# Patient Record
Sex: FEMALE | URBAN - METROPOLITAN AREA
[De-identification: names, ages, dates, MRNs, and addresses within clinical notes are randomized per-mention and may not be internally consistent; named-entity substitution may affect disease eponyms.]

---

## 2019-09-04 ENCOUNTER — APPOINTMENT (RX ONLY)
Dept: URBAN - METROPOLITAN AREA CLINIC 88 | Facility: CLINIC | Age: 34
Setting detail: DERMATOLOGY
End: 2019-09-04

## 2019-09-04 DIAGNOSIS — L73.2 HIDRADENITIS SUPPURATIVA: ICD-10-CM

## 2019-09-04 PROCEDURE — ? COUNSELING

## 2019-09-04 PROCEDURE — ? ADDITIONAL NOTES

## 2019-09-04 PROCEDURE — 99202 OFFICE O/P NEW SF 15 MIN: CPT

## 2019-09-04 ASSESSMENT — LOCATION ZONE DERM
LOCATION ZONE: VULVA
LOCATION ZONE: LEG

## 2019-09-04 ASSESSMENT — LOCATION DETAILED DESCRIPTION DERM
LOCATION DETAILED: RIGHT ANTERIOR PROXIMAL THIGH
LOCATION DETAILED: MONS PUBIS
LOCATION DETAILED: LEFT ANTERIOR PROXIMAL THIGH

## 2019-09-04 ASSESSMENT — LOCATION SIMPLE DESCRIPTION DERM
LOCATION SIMPLE: RIGHT THIGH
LOCATION SIMPLE: GROIN
LOCATION SIMPLE: LEFT THIGH

## 2019-09-04 NOTE — PROCEDURE: ADDITIONAL NOTES
Additional Notes: pt has been dealing with HS for several years and has tried oral abx, topical abx, and injections of IlK as well as surgery in the past without Improvement.  She is currently at hurler stage three of HS with lots of sinus tract formations.  I explained that oral abx will not resolve her condition, but a short course may give her time to consider plastic surgery options.  Patient is to go to a plastic surgeon for a consultation visit due to the severity of the condition.  We also consulted the patient on using humira. Patient was given information on the medication and wanted to be given time to decide, she is concerned with the side effects of humira.  Pt to call me back in case she wants to start oral abx or if she wants to begin humira.  Name of local plastic surgeon provided to pt today.
Detail Level: Zone

## 2022-06-02 ENCOUNTER — APPOINTMENT (RX ONLY)
Dept: URBAN - METROPOLITAN AREA CLINIC 114 | Facility: CLINIC | Age: 37
Setting detail: DERMATOLOGY
End: 2022-06-02

## 2022-06-02 DIAGNOSIS — L73.2 HIDRADENITIS SUPPURATIVA: ICD-10-CM | Status: WORSENING

## 2022-06-02 PROCEDURE — ? ORDER TESTS

## 2022-06-02 PROCEDURE — 99204 OFFICE O/P NEW MOD 45 MIN: CPT

## 2022-06-02 PROCEDURE — ? HUMIRA INITIATION

## 2022-06-02 PROCEDURE — ? COUNSELING

## 2022-06-02 PROCEDURE — ? ADDITIONAL NOTES

## 2022-06-02 ASSESSMENT — LOCATION ZONE DERM: LOCATION ZONE: VULVA

## 2022-06-02 ASSESSMENT — LOCATION SIMPLE DESCRIPTION DERM: LOCATION SIMPLE: GROIN

## 2022-06-02 ASSESSMENT — HURLEY STAGE
IN YOUR EXPERIENCE, AMONG ALL PATIENTS YOU HAVE SEEN WITH THIS CONDITION, HOW SEVERE IS THIS PATIENT'S CONDITION?: HURLEY STAGE III: MULTIPLE INTERCONNECTED SINUS TRACTS AND ABSCESSES THROUGHOUT AN ENTIRE AREA

## 2022-06-02 ASSESSMENT — LOCATION DETAILED DESCRIPTION DERM: LOCATION DETAILED: MONS PUBIS

## 2022-06-02 NOTE — PROCEDURE: HUMIRA INITIATION
Detail Level: Zone
Is Phototherapy Contraindicated?: No
Pregnancy And Lactation Warning Text: This medication is Pregnancy Category B and is considered safe during pregnancy. It is unknown if this medication is excreted in breast milk.
Humira Dosing: 80 mg SC day 0, 40 mg SC day 7, then 40 mg SC every other week
Diagnosis (Required): Psoriasis
Humira Monitoring Guidelines: A yearly test for tuberculosis is required while taking Humira.

## 2022-06-02 NOTE — HPI: RASH
What Type Of Note Output Would You Prefer (Optional)?: Bullet Format
How Severe Is Your Rash?: severe
Is This A New Presentation, Or A Follow-Up?: Rash
Additional History: \\nPt is here rash in groin area was Sx years ago with HS and has multiple treatment including surgery after surgery five years ago resurfaced no tx was done.

## 2022-06-02 NOTE — PROCEDURE: ORDER TESTS
Bill For Surgical Tray: no
Performing Laboratory: [id number]
Lab Facility: [id number]
Billing Type: Third-Party Bill
Expected Date Of Service: 06/02/2022

## 2022-06-02 NOTE — PROCEDURE: ADDITIONAL NOTES
Render Risk Assessment In Note?: no
Additional Notes: Discussed in length with pt Humira and its side effects and what it can be used for such Psoriatic arthritis patients. Discussed with pt that  there will other medication that will come to help with HS. Discuss that Humira will help with her severe HS as she has failed oral antibiotics, ILK, and topical medications
Detail Level: Simple
not examined

## 2022-08-10 ENCOUNTER — RX ONLY (OUTPATIENT)
Age: 37
Setting detail: RX ONLY
End: 2022-08-10

## 2022-08-10 RX ORDER — ADALIMUMAB 80MG/0.8ML
KIT SUBCUTANEOUS
Qty: 1 | Refills: 0 | Status: ERX | COMMUNITY
Start: 2022-08-10

## 2022-08-16 ENCOUNTER — APPOINTMENT (RX ONLY)
Dept: URBAN - METROPOLITAN AREA CLINIC 114 | Facility: CLINIC | Age: 37
Setting detail: DERMATOLOGY
End: 2022-08-16

## 2022-08-16 DIAGNOSIS — Z79.899 OTHER LONG TERM (CURRENT) DRUG THERAPY: ICD-10-CM

## 2022-08-16 DIAGNOSIS — L73.2 HIDRADENITIS SUPPURATIVA: ICD-10-CM | Status: WORSENING

## 2022-08-16 PROCEDURE — ? HUMIRA INJECTION

## 2022-08-16 PROCEDURE — ? SEPARATE AND IDENTIFIABLE DOCUMENTATION

## 2022-08-16 PROCEDURE — ? COUNSELING

## 2022-08-16 PROCEDURE — ? LAB REPORTS REVIEWED

## 2022-08-16 PROCEDURE — ? HIGH RISK MEDICATION MONITORING

## 2022-08-16 PROCEDURE — 99214 OFFICE O/P EST MOD 30 MIN: CPT | Mod: 25

## 2022-08-16 PROCEDURE — 96372 THER/PROPH/DIAG INJ SC/IM: CPT

## 2022-08-16 PROCEDURE — ? ADDITIONAL NOTES

## 2022-08-16 ASSESSMENT — LOCATION SIMPLE DESCRIPTION DERM
LOCATION SIMPLE: LEFT THIGH
LOCATION SIMPLE: RIGHT THIGH
LOCATION SIMPLE: GROIN

## 2022-08-16 ASSESSMENT — LOCATION DETAILED DESCRIPTION DERM
LOCATION DETAILED: LEFT ANTERIOR PROXIMAL THIGH
LOCATION DETAILED: MONS PUBIS
LOCATION DETAILED: RIGHT ANTERIOR PROXIMAL THIGH

## 2022-08-16 ASSESSMENT — LOCATION ZONE DERM
LOCATION ZONE: VULVA
LOCATION ZONE: LEG

## 2022-08-16 NOTE — PROCEDURE: HIGH RISK MEDICATION MONITORING
Cyclophosphamide Counseling:  I discussed with the patient the risks of cyclophosphamide including but not limited to hair loss, hormonal abnormalities, decreased fertility, abdominal pain, diarrhea, nausea and vomiting, bone marrow suppression and infection. The patient understands that monitoring is required while taking this medication.
Ilumya Pregnancy And Lactation Text: The risk during pregnancy and breastfeeding is uncertain with this medication.
Drysol Pregnancy And Lactation Text: This medication is considered safe during pregnancy and breast feeding.
Minocycline Counseling: Patient advised regarding possible photosensitivity and discoloration of the teeth, skin, lips, tongue and gums.  Patient instructed to avoid sunlight, if possible.  When exposed to sunlight, patients should wear protective clothing, sunglasses, and sunscreen.  The patient was instructed to call the office immediately if the following severe adverse effects occur:  hearing changes, easy bruising/bleeding, severe headache, or vision changes.  The patient verbalized understanding of the proper use and possible adverse effects of minocycline.  All of the patient's questions and concerns were addressed.
Dutasteride Pregnancy And Lactation Text: This medication is absolutely contraindicated in women, especially during pregnancy and breast feeding. Feminization of male fetuses is possible if taking while pregnant.
Birth Control Pills Pregnancy And Lactation Text: This medication should be avoided if pregnant and for the first 30 days post-partum.
Detail Level: Detailed
Terbinafine Counseling: Patient counseling regarding adverse effects of terbinafine including but not limited to headache, diarrhea, rash, upset stomach, liver function test abnormalities, itching, taste/smell disturbance, nausea, abdominal pain, and flatulence.  There is a rare possibility of liver failure that can occur when taking terbinafine.  The patient understands that a baseline LFT and kidney function test may be required. The patient verbalized understanding of the proper use and possible adverse effects of terbinafine.  All of the patient's questions and concerns were addressed.
Hydroxychloroquine Counseling:  I discussed with the patient that a baseline ophthalmologic exam is needed at the start of therapy and every year thereafter while on therapy. A CBC may also be warranted for monitoring.  The side effects of this medication were discussed with the patient, including but not limited to agranulocytosis, aplastic anemia, seizures, rashes, retinopathy, and liver toxicity. Patient instructed to call the office should any adverse effect occur.  The patient verbalized understanding of the proper use and possible adverse effects of Plaquenil.  All the patient's questions and concerns were addressed.
Topical Sulfur Applications Pregnancy And Lactation Text: This medication is considered safe during pregnancy and breast feeding secondary to limited systemic absorption.
Skyrizi Counseling: I discussed with the patient the risks of risankizumab-rzaa including but not limited to immunosuppression, and serious infections.  The patient understands that monitoring is required including a PPD at baseline and must alert us or the primary physician if symptoms of infection or other concerning signs are noted.
Minoxidil Counseling: Minoxidil is a topical medication which can increase blood flow where it is applied. It is uncertain how this medication increases hair growth. Side effects are uncommon and include stinging and allergic reactions.
Bactrim Counseling:  I discussed with the patient the risks of sulfa antibiotics including but not limited to GI upset, allergic reaction, drug rash, diarrhea, dizziness, photosensitivity, and yeast infections.  Rarely, more serious reactions can occur including but not limited to aplastic anemia, agranulocytosis, methemoglobinemia, blood dyscrasias, liver or kidney failure, lung infiltrates or desquamative/blistering drug rashes.
Minoxidil Pregnancy And Lactation Text: This medication has not been assigned a Pregnancy Risk Category but animal studies failed to show danger with the topical medication. It is unknown if the medication is excreted in breast milk.
Bactrim Pregnancy And Lactation Text: This medication is Pregnancy Category D and is known to cause fetal risk.  It is also excreted in breast milk.
Benzoyl Peroxide Counseling: Patient counseled that medicine may cause skin irritation and bleach clothing.  In the event of skin irritation, the patient was advised to reduce the amount of the drug applied or use it less frequently.   The patient verbalized understanding of the proper use and possible adverse effects of benzoyl peroxide.  All of the patient's questions and concerns were addressed.
Wartpeel Counseling:  I discussed with the patient the risks of Wartpeel including but not limited to erythema, scaling, itching, weeping, crusting, and pain.
Hydroxychloroquine Pregnancy And Lactation Text: This medication has been shown to cause fetal harm but it isn't assigned a Pregnancy Risk Category. There are small amounts excreted in breast milk.
Solaraze Counseling:  I discussed with the patient the risks of Solaraze including but not limited to erythema, scaling, itching, weeping, crusting, and pain.
Oral Minoxidil Counseling- I discussed with the patient the risks of oral minoxidil including but not limited to shortness of breath, swelling of the feet or ankles, dizziness, lightheadedness, unwanted hair growth and allergic reaction.  The patient verbalized understanding of the proper use and possible adverse effects of oral minoxidil.  All of the patient's questions and concerns were addressed.
Albendazole Pregnancy And Lactation Text: This medication is Pregnancy Category C and it isn't known if it is safe during pregnancy. It is also excreted in breast milk.
Arava Counseling:  Patient counseled regarding adverse effects of Arava including but not limited to nausea, vomiting, abnormalities in liver function tests. Patients may develop mouth sores, rash, diarrhea, and abnormalities in blood counts. The patient understands that monitoring is required including LFTs and blood counts.  There is a rare possibility of scarring of the liver and lung problems that can occur when taking methotrexate. Persistent nausea, loss of appetite, pale stools, dark urine, cough, and shortness of breath should be reported immediately. Patient advised to discontinue Arava treatment and consult with a physician prior to attempting conception. The patient will have to undergo a treatment to eliminate Arava from the body prior to conception.
Acitretin Pregnancy And Lactation Text: This medication is Pregnancy Category X and should not be given to women who are pregnant or may become pregnant in the future. This medication is excreted in breast milk.
Dupixent Counseling: I discussed with the patient the risks of dupilumab including but not limited to eye infection and irritation, cold sores, injection site reactions, worsening of asthma, allergic reactions and increased risk of parasitic infection.  Live vaccines should be avoided while taking dupilumab. Dupilumab will also interact with certain medications such as warfarin and cyclosporine. The patient understands that monitoring is required and they must alert us or the primary physician if symptoms of infection or other concerning signs are noted.
Fluconazole Counseling:  Patient counseled regarding adverse effects of fluconazole including but not limited to headache, diarrhea, nausea, upset stomach, liver function test abnormalities, taste disturbance, and stomach pain.  There is a rare possibility of liver failure that can occur when taking fluconazole.  The patient understands that monitoring of LFTs and kidney function test may be required, especially at baseline. The patient verbalized understanding of the proper use and possible adverse effects of fluconazole.  All of the patient's questions and concerns were addressed.
Dupixent Pregnancy And Lactation Text: This medication likely crosses the placenta but the risk for the fetus is uncertain. This medication is excreted in breast milk.
Infliximab Counseling:  I discussed with the patient the risks of infliximab including but not limited to myelosuppression, immunosuppression, autoimmune hepatitis, demyelinating diseases, lymphoma, and serious infections.  The patient understands that monitoring is required including a PPD at baseline and must alert us or the primary physician if symptoms of infection or other concerning signs are noted.
Bexarotene Counseling:  I discussed with the patient the risks of bexarotene including but not limited to hair loss, dry lips/skin/eyes, liver abnormalities, hyperlipidemia, pancreatitis, depression/suicidal ideation, photosensitivity, drug rash/allergic reactions, hypothyroidism, anemia, leukopenia, infection, cataracts, and teratogenicity.  Patient understands that they will need regular blood tests to check lipid profile, liver function tests, white blood cell count, thyroid function tests and pregnancy test if applicable.
Cyclophosphamide Pregnancy And Lactation Text: This medication is Pregnancy Category D and it isn't considered safe during pregnancy. This medication is excreted in breast milk.
Erivedge Counseling- I discussed with the patient the risks of Erivedge including but not limited to nausea, vomiting, diarrhea, constipation, weight loss, changes in the sense of taste, decreased appetite, muscle spasms, and hair loss.  The patient verbalized understanding of the proper use and possible adverse effects of Erivedge.  All of the patient's questions and concerns were addressed.
Spironolactone Counseling: Patient advised regarding risks of diarrhea, abdominal pain, hyperkalemia, birth defects (for female patients), liver toxicity and renal toxicity. The patient may need blood work to monitor liver and kidney function and potassium levels while on therapy. The patient verbalized understanding of the proper use and possible adverse effects of spironolactone.  All of the patient's questions and concerns were addressed.
Elidel Counseling: Patient may experience a mild burning sensation during topical application. Elidel is not approved in children less than 2 years of age. There have been case reports of hematologic and skin malignancies in patients using topical calcineurin inhibitors although causality is questionable.
Minocycline Pregnancy And Lactation Text: This medication is Pregnancy Category D and not consider safe during pregnancy. It is also excreted in breast milk.
Topical Retinoid Pregnancy And Lactation Text: This medication is Pregnancy Category C. It is unknown if this medication is excreted in breast milk.
Xeljanz Counseling: I discussed with the patient the risks of Xeljanz therapy including increased risk of infection, liver issues, headache, diarrhea, or cold symptoms. Live vaccines should be avoided. They were instructed to call if they have any problems.
Terbinafine Pregnancy And Lactation Text: This medication is Pregnancy Category B and is considered safe during pregnancy. It is also excreted in breast milk and breast feeding isn't recommended.
Spironolactone Pregnancy And Lactation Text: This medication can cause feminization of the male fetus and should be avoided during pregnancy. The active metabolite is also found in breast milk.
Quinolones Counseling:  I discussed with the patient the risks of fluoroquinolones including but not limited to GI upset, allergic reaction, drug rash, diarrhea, dizziness, photosensitivity, yeast infections, liver function test abnormalities, tendonitis/tendon rupture.
Libtayo Counseling- I discussed with the patient the risks of Libtayo including but not limited to nausea, vomiting, diarrhea, and bone or muscle pain.  The patient verbalized understanding of the proper use and possible adverse effects of Libtayo.  All of the patient's questions and concerns were addressed.
Mirvaso Counseling: Mirvaso is a topical medication which can decrease superficial blood flow where applied. Side effects are uncommon and include stinging, redness and allergic reactions.
Cephalexin Counseling: I counseled the patient regarding use of cephalexin as an antibiotic for prophylactic and/or therapeutic purposes. Cephalexin (commonly prescribed under brand name Keflex) is a cephalosporin antibiotic which is active against numerous classes of bacteria, including most skin bacteria. Side effects may include nausea, diarrhea, gastrointestinal upset, rash, hives, yeast infections, and in rare cases, hepatitis, kidney disease, seizures, fever, confusion, neurologic symptoms, and others. Patients with severe allergies to penicillin medications are cautioned that there is about a 10% incidence of cross-reactivity with cephalosporins. When possible, patients with penicillin allergies should use alternatives to cephalosporins for antibiotic therapy.
Wartpeel Pregnancy And Lactation Text: This medication is Pregnancy Category X and contraindicated in pregnancy and in women who may become pregnant. It is unknown if this medication is excreted in breast milk.
Benzoyl Peroxide Pregnancy And Lactation Text: This medication is Pregnancy Category C. It is unknown if benzoyl peroxide is excreted in breast milk.
Clindamycin Counseling: I counseled the patient regarding use of clindamycin as an antibiotic for prophylactic and/or therapeutic purposes. Clindamycin is active against numerous classes of bacteria, including skin bacteria. Side effects may include nausea, diarrhea, gastrointestinal upset, rash, hives, yeast infections, and in rare cases, colitis.
Ivermectin Counseling:  Patient instructed to take medication on an empty stomach with a full glass of water.  Patient informed of potential adverse effects including but not limited to nausea, diarrhea, dizziness, itching, and swelling of the extremities or lymph nodes.  The patient verbalized understanding of the proper use and possible adverse effects of ivermectin.  All of the patient's questions and concerns were addressed.
Oral Minoxidil Pregnancy And Lactation Text: This medication should only be used when clearly needed if you are pregnant, attempting to become pregnant or breast feeding.
Arava Pregnancy And Lactation Text: This medication is Pregnancy Category X and is absolutely contraindicated during pregnancy. It is unknown if it is excreted in breast milk.
Solaraze Pregnancy And Lactation Text: This medication is Pregnancy Category B and is considered safe. There is some data to suggest avoiding during the third trimester. It is unknown if this medication is excreted in breast milk.
Topical Retinoid counseling:  Patient advised to apply a pea-sized amount only at bedtime and wait 30 minutes after washing their face before applying.  If too drying, patient may add a non-comedogenic moisturizer. The patient verbalized understanding of the proper use and possible adverse effects of retinoids.  All of the patient's questions and concerns were addressed.
Carac Counseling:  I discussed with the patient the risks of Carac including but not limited to erythema, scaling, itching, weeping, crusting, and pain.
Fluconazole Pregnancy And Lactation Text: This medication is Pregnancy Category C and it isn't know if it is safe during pregnancy. It is also excreted in breast milk.
Cimetidine Counseling:  I discussed with the patient the risks of Cimetidine including but not limited to gynecomastia, headache, diarrhea, nausea, drowsiness, arrhythmias, pancreatitis, skin rashes, psychosis, bone marrow suppression and kidney toxicity.
Otezla Counseling: The side effects of Otezla were discussed with the patient, including but not limited to worsening or new depression, weight loss, diarrhea, nausea, upper respiratory tract infection, and headache. Patient instructed to call the office should any adverse effect occur.  The patient verbalized understanding of the proper use and possible adverse effects of Otezla.  All the patient's questions and concerns were addressed.
Clindamycin Pregnancy And Lactation Text: This medication can be used in pregnancy if certain situations. Clindamycin is also present in breast milk.
Clofazimine Counseling:  I discussed with the patient the risks of clofazimine including but not limited to skin and eye pigmentation, liver damage, nausea/vomiting, gastrointestinal bleeding and allergy.
Infliximab Pregnancy And Lactation Text: This medication is Pregnancy Category B and is considered safe during pregnancy. It is unknown if this medication is excreted in breast milk.
Bexarotene Pregnancy And Lactation Text: This medication is Pregnancy Category X and should not be given to women who are pregnant or may become pregnant. This medication should not be used if you are breast feeding.
Tazorac Counseling:  Patient advised that medication is irritating and drying.  Patient may need to apply sparingly and wash off after an hour before eventually leaving it on overnight.  The patient verbalized understanding of the proper use and possible adverse effects of tazorac.  All of the patient's questions and concerns were addressed.
Cyclosporine Counseling:  I discussed with the patient the risks of cyclosporine including but not limited to hypertension, gingival hyperplasia,myelosuppression, immunosuppression, liver damage, kidney damage, neurotoxicity, lymphoma, and serious infections. The patient understands that monitoring is required including baseline blood pressure, CBC, CMP, lipid panel and uric acid, and then 1-2 times monthly CMP and blood pressure.
Xeliraidaz Pregnancy And Lactation Text: This medication is Pregnancy Category D and is not considered safe during pregnancy.  The risk during breast feeding is also uncertain.
Valtrex Counseling: I discussed with the patient the risks of valacyclovir including but not limited to kidney damage, nausea, vomiting and severe allergy.  The patient understands that if the infection seems to be worsening or is not improving, they are to call.
Include Pregnancy/Lactation Warning?: No
Rituxan Counseling:  I discussed with the patient the risks of Rituxan infusions. Side effects can include infusion reactions, severe drug rashes including mucocutaneous reactions, reactivation of latent hepatitis and other infections and rarely progressive multifocal leukoencephalopathy.  All of the patient's questions and concerns were addressed.
Eucrisa Counseling: Patient may experience a mild burning sensation during topical application. Eucrisa is not approved in children less than 3 months of age.
Cyclosporine Pregnancy And Lactation Text: This medication is Pregnancy Category C and it isn't know if it is safe during pregnancy. This medication is excreted in breast milk.
SSKI Counseling:  I discussed with the patient the risks of SSKI including but not limited to thyroid abnormalities, metallic taste, GI upset, fever, headache, acne, arthralgias, paraesthesias, lymphadenopathy, easy bleeding, arrhythmias, and allergic reaction.
Xolair Counseling:  Patient informed of potential adverse effects including but not limited to fever, muscle aches, rash and allergic reactions.  The patient verbalized understanding of the proper use and possible adverse effects of Xolair.  All of the patient's questions and concerns were addressed.
Tazorac Pregnancy And Lactation Text: This medication is not safe during pregnancy. It is unknown if this medication is excreted in breast milk.
Finasteride Counseling:  I discussed with the patient the risks of use of finasteride including but not limited to decreased libido, decreased ejaculate volume, gynecomastia, and depression. Women should not handle medication.  All of the patient's questions and concerns were addressed.
Mirvaso Pregnancy And Lactation Text: This medication has not been assigned a Pregnancy Risk Category. It is unknown if the medication is excreted in breast milk.
Libtayo Pregnancy And Lactation Text: This medication is contraindicated in pregnancy and when breast feeding.
Cephalexin Pregnancy And Lactation Text: This medication is Pregnancy Category B and considered safe during pregnancy.  It is also excreted in breast milk but can be used safely for shorter doses.
Winlevi Counseling:  I discussed with the patient the risks of topical clascoterone including but not limited to erythema, scaling, itching, and stinging. Patient voiced their understanding.
Otezla Pregnancy And Lactation Text: This medication is Pregnancy Category C and it isn't known if it is safe during pregnancy. It is unknown if it is excreted in breast milk.
Doxycycline Counseling:  Patient counseled regarding possible photosensitivity and increased risk for sunburn.  Patient instructed to avoid sunlight, if possible.  When exposed to sunlight, patients should wear protective clothing, sunglasses, and sunscreen.  The patient was instructed to call the office immediately if the following severe adverse effects occur:  hearing changes, easy bruising/bleeding, severe headache, or vision changes.  The patient verbalized understanding of the proper use and possible adverse effects of doxycycline.  All of the patient's questions and concerns were addressed.
Clofazimine Pregnancy And Lactation Text: This medication is Pregnancy Category C and isn't considered safe during pregnancy. It is excreted in breast milk.
Isotretinoin Counseling: Patient should get monthly blood tests, not donate blood, not drive at night if vision affected, not share medication, and not undergo elective surgery for 6 months after tx completed. Side effects reviewed, pt to contact office should one occur.
Valtrex Pregnancy And Lactation Text: this medication is Pregnancy Category B and is considered safe during pregnancy. This medication is not directly found in breast milk but it's metabolite acyclovir is present.
Griseofulvin Counseling:  I discussed with the patient the risks of griseofulvin including but not limited to photosensitivity, cytopenia, liver damage, nausea/vomiting and severe allergy.  The patient understands that this medication is best absorbed when taken with a fatty meal (e.g., ice cream or french fries).
Griseofulvin Pregnancy And Lactation Text: This medication is Pregnancy Category X and is known to cause serious birth defects. It is unknown if this medication is excreted in breast milk but breast feeding should be avoided.
Doxepin Counseling:  Patient advised that the medication is sedating and not to drive a car after taking this medication. Patient informed of potential adverse effects including but not limited to dry mouth, urinary retention, and blurry vision.  The patient verbalized understanding of the proper use and possible adverse effects of doxepin.  All of the patient's questions and concerns were addressed.
Oxybutynin Counseling:  I discussed with the patient the risks of oxybutynin including but not limited to skin rash, drowsiness, dry mouth, difficulty urinating, and blurred vision.
Rituxan Pregnancy And Lactation Text: This medication is Pregnancy Category C and it isn't know if it is safe during pregnancy. It is unknown if this medication is excreted in breast milk but similar antibodies are known to be excreted.
Methotrexate Counseling:  Patient counseled regarding adverse effects of methotrexate including but not limited to nausea, vomiting, abnormalities in liver function tests. Patients may develop mouth sores, rash, diarrhea, and abnormalities in blood counts. The patient understands that monitoring is required including LFT's and blood counts.  There is a rare possibility of scarring of the liver and lung problems that can occur when taking methotrexate. Persistent nausea, loss of appetite, pale stools, dark urine, cough, and shortness of breath should be reported immediately. Patient advised to discontinue methotrexate treatment at least three months before attempting to become pregnant.  I discussed the need for folate supplements while taking methotrexate.  These supplements can decrease side effects during methotrexate treatment. The patient verbalized understanding of the proper use and possible adverse effects of methotrexate.  All of the patient's questions and concerns were addressed.
Finasteride Pregnancy And Lactation Text: This medication is absolutely contraindicated during pregnancy. It is unknown if it is excreted in breast milk.
Stelara Counseling:  I discussed with the patient the risks of ustekinumab including but not limited to immunosuppression, malignancy, posterior leukoencephalopathy syndrome, and serious infections.  The patient understands that monitoring is required including a PPD at baseline and must alert us or the primary physician if symptoms of infection or other concerning signs are noted.
Sski Pregnancy And Lactation Text: This medication is Pregnancy Category D and isn't considered safe during pregnancy. It is excreted in breast milk.
Rifampin Counseling: I discussed with the patient the risks of rifampin including but not limited to liver damage, kidney damage, red-orange body fluids, nausea/vomiting and severe allergy.
Xolair Pregnancy And Lactation Text: This medication is Pregnancy Category B and is considered safe during pregnancy. This medication is excreted in breast milk.
Topical Clindamycin Counseling: Patient counseled that this medication may cause skin irritation or allergic reactions.  In the event of skin irritation, the patient was advised to reduce the amount of the drug applied or use it less frequently.   The patient verbalized understanding of the proper use and possible adverse effects of clindamycin.  All of the patient's questions and concerns were addressed.
Enbrel Counseling:  I discussed with the patient the risks of etanercept including but not limited to myelosuppression, immunosuppression, autoimmune hepatitis, demyelinating diseases, lymphoma, and infections.  The patient understands that monitoring is required including a PPD at baseline and must alert us or the primary physician if symptoms of infection or other concerning signs are noted.
Niacinamide Counseling: I recommended taking niacin or niacinamide, also know as vitamin B3, twice daily. Recent evidence suggests that taking vitamin B3 (500 mg twice daily) can reduce the risk of actinic keratoses and non-melanoma skin cancers. Side effects of vitamin B3 include flushing and headache.
Picato Counseling:  I discussed with the patient the risks of Picato including but not limited to erythema, scaling, itching, weeping, crusting, and pain.
Winlevi Pregnancy And Lactation Text: This medication is considered safe during pregnancy and breastfeeding.
Zyclara Counseling:  I discussed with the patient the risks of imiquimod including but not limited to erythema, scaling, itching, weeping, crusting, and pain.  Patient understands that the inflammatory response to imiquimod is variable from person to person and was educated regarded proper titration schedule.  If flu-like symptoms develop, patient knows to discontinue the medication and contact us.
Niacinamide Pregnancy And Lactation Text: These medications are considered safe during pregnancy.
Doxycycline Pregnancy And Lactation Text: This medication is Pregnancy Category D and not consider safe during pregnancy. It is also excreted in breast milk but is considered safe for shorter treatment courses.
Colchicine Counseling:  Patient counseled regarding adverse effects including but not limited to stomach upset (nausea, vomiting, stomach pain, or diarrhea).  Patient instructed to limit alcohol consumption while taking this medication.  Colchicine may reduce blood counts especially with prolonged use.  The patient understands that monitoring of kidney function and blood counts may be required, especially at baseline. The patient verbalized understanding of the proper use and possible adverse effects of colchicine.  All of the patient's questions and concerns were addressed.
Calcipotriene Counseling:  I discussed with the patient the risks of calcipotriene including but not limited to erythema, scaling, itching, and irritation.
Isotretinoin Pregnancy And Lactation Text: This medication is Pregnancy Category X and is considered extremely dangerous during pregnancy. It is unknown if it is excreted in breast milk.
Calcipotriene Pregnancy And Lactation Text: This medication has not been proven safe during pregnancy. It is unknown if this medication is excreted in breast milk.
Azathioprine Counseling:  I discussed with the patient the risks of azathioprine including but not limited to myelosuppression, immunosuppression, hepatotoxicity, lymphoma, and infections.  The patient understands that monitoring is required including baseline LFTs, Creatinine, possible TPMP genotyping and weekly CBCs for the first month and then every 2 weeks thereafter.  The patient verbalized understanding of the proper use and possible adverse effects of azathioprine.  All of the patient's questions and concerns were addressed.
Siliq Counseling:  I discussed with the patient the risks of Siliq including but not limited to new or worsening depression, suicidal thoughts and behavior, immunosuppression, malignancy, posterior leukoencephalopathy syndrome, and serious infections.  The patient understands that monitoring is required including a PPD at baseline and must alert us or the primary physician if symptoms of infection or other concerning signs are noted. There is also a special program designed to monitor depression which is required with Siliq.
High Dose Vitamin A Counseling: Side effects reviewed, pt to contact office should one occur.
Doxepin Pregnancy And Lactation Text: This medication is Pregnancy Category C and it isn't known if it is safe during pregnancy. It is also excreted in breast milk and breast feeding isn't recommended.
Erythromycin Counseling:  I discussed with the patient the risks of erythromycin including but not limited to GI upset, allergic reaction, drug rash, diarrhea, increase in liver enzymes, and yeast infections.
Oxybutynin Pregnancy And Lactation Text: This medication is Pregnancy Category B and is considered safe during pregnancy. It is unknown if it is excreted in breast milk.
Hydroquinone Counseling:  Patient advised that medication may result in skin irritation, lightening (hypopigmentation), dryness, and burning.  In the event of skin irritation, the patient was advised to reduce the amount of the drug applied or use it less frequently.  Rarely, spots that are treated with hydroquinone can become darker (pseudoochronosis).  Should this occur, patient instructed to stop medication and call the office. The patient verbalized understanding of the proper use and possible adverse effects of hydroquinone.  All of the patient's questions and concerns were addressed.
Thalidomide Counseling: I discussed with the patient the risks of thalidomide including but not limited to birth defects, anxiety, weakness, chest pain, dizziness, cough and severe allergy.
Rifampin Pregnancy And Lactation Text: This medication is Pregnancy Category C and it isn't know if it is safe during pregnancy. It is also excreted in breast milk and should not be used if you are breast feeding.
Gabapentin Counseling: I discussed with the patient the risks of gabapentin including but not limited to dizziness, somnolence, fatigue and ataxia.
Itraconazole Counseling:  I discussed with the patient the risks of itraconazole including but not limited to liver damage, nausea/vomiting, neuropathy, and severe allergy.  The patient understands that this medication is best absorbed when taken with acidic beverages such as non-diet cola or ginger ale.  The patient understands that monitoring is required including baseline LFTs and repeat LFTs at intervals.  The patient understands that they are to contact us or the primary physician if concerning signs are noted.
Methotrexate Pregnancy And Lactation Text: This medication is Pregnancy Category X and is known to cause fetal harm. This medication is excreted in breast milk.
Nsaids Counseling: NSAID Counseling: I discussed with the patient that NSAIDs should be taken with food. Prolonged use of NSAIDs can result in the development of stomach ulcers.  Patient advised to stop taking NSAIDs if abdominal pain occurs.  The patient verbalized understanding of the proper use and possible adverse effects of NSAIDs.  All of the patient's questions and concerns were addressed.
Protopic Counseling: Patient may experience a mild burning sensation during topical application. Protopic is not approved in children less than 2 years of age. There have been case reports of hematologic and skin malignancies in patients using topical calcineurin inhibitors although causality is questionable.
Cimzia Counseling:  I discussed with the patient the risks of Cimzia including but not limited to immunosuppression, allergic reactions and infections.  The patient understands that monitoring is required including a PPD at baseline and must alert us or the primary physician if symptoms of infection or other concerning signs are noted.
Cimzia Pregnancy And Lactation Text: This medication crosses the placenta but can be considered safe in certain situations. Cimzia may be excreted in breast milk.
Nsaids Pregnancy And Lactation Text: These medications are considered safe up to 30 weeks gestation. It is excreted in breast milk.
Humira Counseling:  I discussed with the patient the risks of adalimumab including but not limited to myelosuppression, immunosuppression, autoimmune hepatitis, demyelinating diseases, lymphoma, and serious infections.  The patient understands that monitoring is required including a PPD at baseline and must alert us or the primary physician if symptoms of infection or other concerning signs are noted.
Azathioprine Pregnancy And Lactation Text: This medication is Pregnancy Category D and isn't considered safe during pregnancy. It is unknown if this medication is excreted in breast milk.
Erythromycin Pregnancy And Lactation Text: This medication is Pregnancy Category B and is considered safe during pregnancy. It is also excreted in breast milk.
Dapsone Counseling: I discussed with the patient the risks of dapsone including but not limited to hemolytic anemia, agranulocytosis, rashes, methemoglobinemia, kidney failure, peripheral neuropathy, headaches, GI upset, and liver toxicity.  Patients who start dapsone require monitoring including baseline LFTs and weekly CBCs for the first month, then every month thereafter.  The patient verbalized understanding of the proper use and possible adverse effects of dapsone.  All of the patient's questions and concerns were addressed.
Hydroxyzine Counseling: Patient advised that the medication is sedating and not to drive a car after taking this medication.  Patient informed of potential adverse effects including but not limited to dry mouth, urinary retention, and blurry vision.  The patient verbalized understanding of the proper use and possible adverse effects of hydroxyzine.  All of the patient's questions and concerns were addressed.
Propranolol Counseling:  I discussed with the patient the risks of propranolol including but not limited to low heart rate, low blood pressure, low blood sugar, restlessness and increased cold sensitivity. They should call the office if they experience any of these side effects.
5-Fu Counseling: 5-Fluorouracil Counseling:  I discussed with the patient the risks of 5-fluorouracil including but not limited to erythema, scaling, itching, weeping, crusting, and pain.
Taltz Counseling: I discussed with the patient the risks of ixekizumab including but not limited to immunosuppression, serious infections, worsening of inflammatory bowel disease and drug reactions.  The patient understands that monitoring is required including a PPD at baseline and must alert us or the primary physician if symptoms of infection or other concerning signs are noted.
High Dose Vitamin A Pregnancy And Lactation Text: High dose vitamin A therapy is contraindicated during pregnancy and breast feeding.
Sarecycline Counseling: Patient advised regarding possible photosensitivity and discoloration of the teeth, skin, lips, tongue and gums.  Patient instructed to avoid sunlight, if possible.  When exposed to sunlight, patients should wear protective clothing, sunglasses, and sunscreen.  The patient was instructed to call the office immediately if the following severe adverse effects occur:  hearing changes, easy bruising/bleeding, severe headache, or vision changes.  The patient verbalized understanding of the proper use and possible adverse effects of sarecycline.  All of the patient's questions and concerns were addressed.
Prednisone Counseling:  I discussed with the patient the risks of prolonged use of prednisone including but not limited to weight gain, insomnia, osteoporosis, mood changes, diabetes, susceptibility to infection, glaucoma and high blood pressure.  In cases where prednisone use is prolonged, patients should be monitored with blood pressure checks, serum glucose levels and an eye exam.  Additionally, the patient may need to be placed on GI prophylaxis, PCP prophylaxis, and calcium and vitamin D supplementation and/or a bisphosphonate.  The patient verbalized understanding of the proper use and the possible adverse effects of prednisone.  All of the patient's questions and concerns were addressed.
Topical Ketoconazole Counseling: Patient counseled that this medication may cause skin irritation or allergic reactions.  In the event of skin irritation, the patient was advised to reduce the amount of the drug applied or use it less frequently.   The patient verbalized understanding of the proper use and possible adverse effects of ketoconazole.  All of the patient's questions and concerns were addressed.
Ketoconazole Counseling:   Patient counseled regarding improving absorption with orange juice.  Adverse effects include but are not limited to breast enlargement, headache, diarrhea, nausea, upset stomach, liver function test abnormalities, taste disturbance, and stomach pain.  There is a rare possibility of liver failure that can occur when taking ketoconazole. The patient understands that monitoring of LFTs may be required, especially at baseline. The patient verbalized understanding of the proper use and possible adverse effects of ketoconazole.  All of the patient's questions and concerns were addressed.
Opioid Counseling: I discussed with the patient the potential side effects of opioids including but not limited to addiction, altered mental status, and depression. I stressed avoiding alcohol, benzodiazepines, muscle relaxants and sleep aids unless specifically okayed by a physician. The patient verbalized understanding of the proper use and possible adverse effects of opioids. All of the patient's questions and concerns were addressed. They were instructed to flush the remaining pills down the toilet if they did not need them for pain.
Simponi Counseling:  I discussed with the patient the risks of golimumab including but not limited to myelosuppression, immunosuppression, autoimmune hepatitis, demyelinating diseases, lymphoma, and serious infections.  The patient understands that monitoring is required including a PPD at baseline and must alert us or the primary physician if symptoms of infection or other concerning signs are noted.
Imiquimod Counseling:  I discussed with the patient the risks of imiquimod including but not limited to erythema, scaling, itching, weeping, crusting, and pain.  Patient understands that the inflammatory response to imiquimod is variable from person to person and was educated regarded proper titration schedule.  If flu-like symptoms develop, patient knows to discontinue the medication and contact us.
Azithromycin Counseling:  I discussed with the patient the risks of azithromycin including but not limited to GI upset, allergic reaction, drug rash, diarrhea, and yeast infections.
Glycopyrrolate Counseling:  I discussed with the patient the risks of glycopyrrolate including but not limited to skin rash, drowsiness, dry mouth, difficulty urinating, and blurred vision.
Tranexamic Acid Counseling:  Patient advised of the small risk of bleeding problems with tranexamic acid. They were also instructed to call if they developed any nausea, vomiting or diarrhea. All of the patient's questions and concerns were addressed.
Protopic Pregnancy And Lactation Text: This medication is Pregnancy Category C. It is unknown if this medication is excreted in breast milk when applied topically.
Rhofade Counseling: Rhofade is a topical medication which can decrease superficial blood flow where applied. Side effects are uncommon and include stinging, redness and allergic reactions.
Azelaic Acid Counseling: Patient counseled that medicine may cause skin irritation and to avoid applying near the eyes.  In the event of skin irritation, the patient was advised to reduce the amount of the drug applied or use it less frequently.   The patient verbalized understanding of the proper use and possible adverse effects of azelaic acid.  All of the patient's questions and concerns were addressed.
Odomzo Counseling- I discussed with the patient the risks of Odomzo including but not limited to nausea, vomiting, diarrhea, constipation, weight loss, changes in the sense of taste, decreased appetite, muscle spasms, and hair loss.  The patient verbalized understanding of the proper use and possible adverse effects of Odomzo.  All of the patient's questions and concerns were addressed.
Propranolol Pregnancy And Lactation Text: This medication is Pregnancy Category C and it isn't known if it is safe during pregnancy. It is excreted in breast milk.
Metronidazole Counseling:  I discussed with the patient the risks of metronidazole including but not limited to seizures, nausea/vomiting, a metallic taste in the mouth, nausea/vomiting and severe allergy.
Hydroxyzine Pregnancy And Lactation Text: This medication is not safe during pregnancy and should not be taken. It is also excreted in breast milk and breast feeding isn't recommended.
Dapsone Pregnancy And Lactation Text: This medication is Pregnancy Category C and is not considered safe during pregnancy or breast feeding.
Cosentyx Counseling:  I discussed with the patient the risks of Cosentyx including but not limited to worsening of Crohn's disease, immunosuppression, allergic reactions and infections.  The patient understands that monitoring is required including a PPD at baseline and must alert us or the primary physician if symptoms of infection or other concerning signs are noted.
Cellcept Counseling:  I discussed with the patient the risks of mycophenolate mofetil including but not limited to infection/immunosuppression, GI upset, hypokalemia, hypercholesterolemia, bone marrow suppression, lymphoproliferative disorders, malignancy, GI ulceration/bleed/perforation, colitis, interstitial lung disease, kidney failure, progressive multifocal leukoencephalopathy, and birth defects.  The patient understands that monitoring is required including a baseline creatinine and regular CBC testing. In addition, patient must alert us immediately if symptoms of infection or other concerning signs are noted.
Ketoconazole Pregnancy And Lactation Text: This medication is Pregnancy Category C and it isn't know if it is safe during pregnancy. It is also excreted in breast milk and breast feeding isn't recommended.
Birth Control Pills Counseling: Birth Control Pill Counseling: I discussed with the patient the potential side effects of OCPs including but not limited to increased risk of stroke, heart attack, thrombophlebitis, deep venous thrombosis, hepatic adenomas, breast changes, GI upset, headaches, and depression.  The patient verbalized understanding of the proper use and possible adverse effects of OCPs. All of the patient's questions and concerns were addressed.
Opioid Pregnancy And Lactation Text: These medications can lead to premature delivery and should be avoided during pregnancy. These medications are also present in breast milk in small amounts.
Tremfya Counseling: I discussed with the patient the risks of guselkumab including but not limited to immunosuppression, serious infections, and drug reactions.  The patient understands that monitoring is required including a PPD at baseline and must alert us or the primary physician if symptoms of infection or other concerning signs are noted.
Glycopyrrolate Pregnancy And Lactation Text: This medication is Pregnancy Category B and is considered safe during pregnancy. It is unknown if it is excreted breast milk.
Tranexamic Acid Pregnancy And Lactation Text: It is unknown if this medication is safe during pregnancy or breast feeding.
Tetracycline Counseling: Patient counseled regarding possible photosensitivity and increased risk for sunburn.  Patient instructed to avoid sunlight, if possible.  When exposed to sunlight, patients should wear protective clothing, sunglasses, and sunscreen.  The patient was instructed to call the office immediately if the following severe adverse effects occur:  hearing changes, easy bruising/bleeding, severe headache, or vision changes.  The patient verbalized understanding of the proper use and possible adverse effects of tetracycline.  All of the patient's questions and concerns were addressed. Patient understands to avoid pregnancy while on therapy due to potential birth defects.
Topical Sulfur Applications Counseling: Topical Sulfur Counseling: Patient counseled that this medication may cause skin irritation or allergic reactions.  In the event of skin irritation, the patient was advised to reduce the amount of the drug applied or use it less frequently.   The patient verbalized understanding of the proper use and possible adverse effects of topical sulfur application.  All of the patient's questions and concerns were addressed.
Azithromycin Pregnancy And Lactation Text: This medication is considered safe during pregnancy and is also secreted in breast milk.
Albendazole Counseling:  I discussed with the patient the risks of albendazole including but not limited to cytopenia, kidney damage, nausea/vomiting and severe allergy.  The patient understands that this medication is being used in an off-label manner.
Ilumya Counseling: I discussed with the patient the risks of tildrakizumab including but not limited to immunosuppression, malignancy, posterior leukoencephalopathy syndrome, and serious infections.  The patient understands that monitoring is required including a PPD at baseline and must alert us or the primary physician if symptoms of infection or other concerning signs are noted.
Acitretin Counseling:  I discussed with the patient the risks of acitretin including but not limited to hair loss, dry lips/skin/eyes, liver damage, hyperlipidemia, depression/suicidal ideation, photosensitivity.  Serious rare side effects can include but are not limited to pancreatitis, pseudotumor cerebri, bony changes, clot formation/stroke/heart attack.  Patient understands that alcohol is contraindicated since it can result in liver toxicity and significantly prolong the elimination of the drug by many years.
Drysol Counseling:  I discussed with the patient the risks of drysol/aluminum chloride including but not limited to skin rash, itching, irritation, burning.
Metronidazole Pregnancy And Lactation Text: This medication is Pregnancy Category B and considered safe during pregnancy.  It is also excreted in breast milk.
Dutasteride Counseling: Dustasteride Counseling:  I discussed with the patient the risks of use of dutasteride including but not limited to decreased libido, decreased ejaculate volume, and gynecomastia. Women who can become pregnant should not handle medication.  All of the patient's questions and concerns were addressed.

## 2022-08-16 NOTE — PROCEDURE: HUMIRA INJECTION
Syringe Size Used (Required For Enhanced Ndc): 80 mg/0.8ml Single Use Pen
Ndc (80 Mg/0.8): [address]-34
J-Code: 
Render If Medication Purchased By Clinic In Visit Note?: Yes
Administered By (Optional): Dr. Bey
Was The Medication Purchased By The Clinic?: No
Ndc (40 Mg/0.8): [address]-62
Treatment Number (Optional): 1
Ndc (40 Mg/0.4): [address]-71
Lot # (Optional): [id number]
Consent: The risks of pain and injection site reactions were reviewed with the patient prior to the injection.
Expiration Date (Optional): 06/2023
Detail Level: None
Amount Injected: 160 mg

## 2022-08-16 NOTE — PROCEDURE: ADDITIONAL NOTES
Render Risk Assessment In Note?: no
Additional Notes: Discussed in length with pt Humira and its side effects and what it can be used for such Psoriatic arthritis patients. Discussed with pt that  there will other medication that will come to help with HS. Discuss that Humira will help with her severe HS as she has failed oral antibiotics, ILK, and topical medications
Detail Level: Simple

## 2022-09-12 ENCOUNTER — RX ONLY (OUTPATIENT)
Age: 37
Setting detail: RX ONLY
End: 2022-09-12

## 2022-09-12 RX ORDER — ADALIMUMAB 80MG/0.8ML
KIT SUBCUTANEOUS
Qty: 1 | Refills: 5 | Status: ERX | COMMUNITY
Start: 2022-09-12

## 2022-11-14 ENCOUNTER — APPOINTMENT (RX ONLY)
Dept: URBAN - METROPOLITAN AREA CLINIC 114 | Facility: CLINIC | Age: 37
Setting detail: DERMATOLOGY
End: 2022-11-14

## 2022-11-14 DIAGNOSIS — L73.2 HIDRADENITIS SUPPURATIVA: ICD-10-CM | Status: WORSENING

## 2022-11-14 DIAGNOSIS — Z79.899 OTHER LONG TERM (CURRENT) DRUG THERAPY: ICD-10-CM

## 2022-11-14 PROCEDURE — ? COUNSELING

## 2022-11-14 PROCEDURE — 99214 OFFICE O/P EST MOD 30 MIN: CPT

## 2022-11-14 PROCEDURE — ? PRESCRIPTION

## 2022-11-14 PROCEDURE — ? ORDER TESTS

## 2022-11-14 PROCEDURE — ? PRESCRIPTION MEDICATION MANAGEMENT

## 2022-11-14 PROCEDURE — ? ADDITIONAL NOTES

## 2022-11-14 PROCEDURE — ? HIGH RISK MEDICATION MONITORING

## 2022-11-14 RX ORDER — PREDNISONE 20 MG/1
TABLET ORAL
Qty: 228 | Refills: 0 | Status: ERX | COMMUNITY
Start: 2022-11-14

## 2022-11-14 RX ORDER — HYDROCODONE BITARTRATE AND ACETAMINOPHEN 7.5; 325 MG/1; MG/1
TABLET ORAL
Qty: 20 | Refills: 0 | Status: ERX | COMMUNITY
Start: 2022-11-14

## 2022-11-14 RX ADMIN — HYDROCODONE BITARTRATE AND ACETAMINOPHEN: 7.5; 325 TABLET ORAL at 00:00

## 2022-11-14 RX ADMIN — PREDNISONE: 20 TABLET ORAL at 00:00

## 2022-11-14 ASSESSMENT — LOCATION SIMPLE DESCRIPTION DERM: LOCATION SIMPLE: GROIN

## 2022-11-14 ASSESSMENT — LOCATION ZONE DERM: LOCATION ZONE: VULVA

## 2022-11-14 ASSESSMENT — LOCATION DETAILED DESCRIPTION DERM: LOCATION DETAILED: MONS PUBIS

## 2022-11-14 NOTE — PROCEDURE: ORDER TESTS
Lab Facility: [id number]
Expected Date Of Service: 11/14/2022
Performing Laboratory: [id number]
Bill For Surgical Tray: no
Billing Type: Third-Party Bill

## 2022-11-21 ENCOUNTER — APPOINTMENT (RX ONLY)
Dept: URBAN - METROPOLITAN AREA CLINIC 114 | Facility: CLINIC | Age: 37
Setting detail: DERMATOLOGY
End: 2022-11-21

## 2022-11-21 ENCOUNTER — RX ONLY (OUTPATIENT)
Age: 37
Setting detail: RX ONLY
End: 2022-11-21

## 2022-11-21 DIAGNOSIS — L73.2 HIDRADENITIS SUPPURATIVA: ICD-10-CM | Status: IMPROVED

## 2022-11-21 DIAGNOSIS — Z79.899 OTHER LONG TERM (CURRENT) DRUG THERAPY: ICD-10-CM

## 2022-11-21 PROCEDURE — ? ADDITIONAL NOTES

## 2022-11-21 PROCEDURE — ? PRESCRIPTION

## 2022-11-21 PROCEDURE — ? HIGH RISK MEDICATION MONITORING

## 2022-11-21 PROCEDURE — ? PRESCRIPTION MEDICATION MANAGEMENT

## 2022-11-21 PROCEDURE — 99214 OFFICE O/P EST MOD 30 MIN: CPT

## 2022-11-21 PROCEDURE — ? COUNSELING

## 2022-11-21 RX ORDER — PREDNISONE 5 MG/1
TABLET ORAL
Qty: 30 | Refills: 0 | Status: ERX

## 2022-11-21 RX ORDER — OMEPRAZOLE 20 MG/1
CAPSULE, DELAYED RELEASE ORAL
Qty: 30 | Refills: 2 | Status: ERX

## 2022-11-21 RX ORDER — HYDROCODONE BITARTRATE AND ACETAMINOPHEN 7.5; 325 MG/1; MG/1
TABLET ORAL
Qty: 20 | Refills: 0 | Status: ERX

## 2022-11-21 RX ORDER — PREDNISONE 5 MG/1
TABLET ORAL
Qty: 30 | Refills: 0 | Status: CANCELLED | COMMUNITY
Start: 2022-11-21

## 2022-11-21 RX ORDER — OMEPRAZOLE 20 MG/1
CAPSULE, DELAYED RELEASE ORAL
Qty: 30 | Refills: 2 | Status: CANCELLED | COMMUNITY
Start: 2022-11-21

## 2022-11-21 RX ADMIN — OMEPRAZOLE: 20 CAPSULE, DELAYED RELEASE ORAL at 00:00

## 2022-11-21 RX ADMIN — PREDNISONE: 5 TABLET ORAL at 00:00

## 2022-11-21 ASSESSMENT — LOCATION DETAILED DESCRIPTION DERM: LOCATION DETAILED: MONS PUBIS

## 2022-11-21 ASSESSMENT — LOCATION ZONE DERM: LOCATION ZONE: VULVA

## 2022-11-21 ASSESSMENT — LOCATION SIMPLE DESCRIPTION DERM: LOCATION SIMPLE: GROIN

## 2022-11-21 NOTE — PROCEDURE: ADDITIONAL NOTES
Render Risk Assessment In Note?: no
Additional Notes: 11/21/22\\nPatient is not showing improvement as anticipated. She will continue the Prednisone amanda prescription given at last visit. Discussed that she is to inject her next Humira shot once she start the last of the Prednisone amanda, 1/2 tablet daily. Discussed in detail she needs to be seen by [facility] in order to find a more effective treatment for her. She was given that following physician name to call and schedule and appointment: \\n\\n-Magaly Kim MD\\n-Annalisa Pina MD\\n-Shannon Ty MD\\n-Addis Vazquez MD\\n-Tara Frederick MD \\n\\n\\n\\nLast visit: \\nDiscussed in length with pt Humira and its side effects and what it can be used for such Psoriatic arthritis patients. Discussed with pt that  there will other medication that will come to help with HS. Discuss that Humira will help with her severe HS as she has failed oral antibiotics, ILK, and topical medications
Detail Level: Simple

## 2022-11-21 NOTE — PROCEDURE: PRESCRIPTION MEDICATION MANAGEMENT
Continue Regimen: prednisone 20 mg tablet \\nQuantity: 228.0 Tablet\\nSig: Take 3 pills x5 days, Take 2 pills x5 days, take 1 pill x5 days then, 1/2 tablet x5 days.
Plan: hydrocodone 7.5 mg-acetaminophen 325 mg tablet \\nQuantity: 20.0 Tablet  Days Supply: 30\\nSig: Take 1 PO Q8 hours PRN pain.\\n\\nPatient is to have her next Humira injection on 11/25/22. Due to current condition of her HS, she will delay her upcoming injection until re-evaluated after steroid taper. Pt reports worsening despite being on humira
Render In Strict Bullet Format?: No
Initiate Treatment: prednisone 5 mg tablet \\nQuantity: 30.0 Tablet\\nSig: Take 1 PO QD x30 days with a full meal.\\n\\n\\nomeprazole 20 mg capsule,delayed release \\nQuantity: 30.0 Capsule\\nSig: Take 1 PO QAM.
Detail Level: Zone

## 2022-12-12 ENCOUNTER — APPOINTMENT (RX ONLY)
Dept: URBAN - METROPOLITAN AREA CLINIC 114 | Facility: CLINIC | Age: 37
Setting detail: DERMATOLOGY
End: 2022-12-12

## 2022-12-12 DIAGNOSIS — L73.2 HIDRADENITIS SUPPURATIVA: ICD-10-CM | Status: WORSENING

## 2022-12-12 DIAGNOSIS — Z79.899 OTHER LONG TERM (CURRENT) DRUG THERAPY: ICD-10-CM

## 2022-12-12 PROCEDURE — ? PRESCRIPTION MEDICATION MANAGEMENT

## 2022-12-12 PROCEDURE — ? COUNSELING

## 2022-12-12 PROCEDURE — ? HIGH RISK MEDICATION MONITORING

## 2022-12-12 PROCEDURE — 99214 OFFICE O/P EST MOD 30 MIN: CPT

## 2022-12-12 PROCEDURE — ? ADDITIONAL NOTES

## 2022-12-12 PROCEDURE — ? PRESCRIPTION

## 2022-12-12 RX ORDER — PREDNISONE 10 MG/1
TABLET ORAL
Qty: 30 | Refills: 0 | Status: ERX | COMMUNITY
Start: 2022-12-12

## 2022-12-12 RX ADMIN — PREDNISONE: 10 TABLET ORAL at 00:00

## 2022-12-12 ASSESSMENT — LOCATION SIMPLE DESCRIPTION DERM: LOCATION SIMPLE: GROIN

## 2022-12-12 ASSESSMENT — LOCATION DETAILED DESCRIPTION DERM: LOCATION DETAILED: MONS PUBIS

## 2022-12-12 ASSESSMENT — LOCATION ZONE DERM: LOCATION ZONE: VULVA

## 2022-12-12 NOTE — PROCEDURE: ADDITIONAL NOTES
Render Risk Assessment In Note?: no
Additional Notes: 12/12/22\\nPatient reports she was doing much better until she lowered the prednisone to 5 mg per day, she was better controlled at 10 mg per day. She was holding humira and restarting this week. will continue 1 month of steroid pills at 10 mg when she was not flaring, then taper very slowly to 5 and 2.5 mg. Will get pre approval for Cosentyx as she has failed humira, she has severe genitals involvement that is worsening on humira not improving. \\n10mg x1 month, 5mg for 2 weeks then, 2.5mg for 1 week then will discontinue. \\n\\n\\n\\n11/21/22\\nPatient is not showing improvement as anticipated. She will continue the Prednisone amanda prescription given at last visit. Discussed that she is to inject her next Humira shot once she start the last of the Prednisone amanda, 1/2 tablet daily. Discussed in detail she needs to be seen by [facility] in order to find a more effective treatment for her. She was given that following physician name to call and schedule and appointment: \\n\\n-Magaly Kim MD\\n-Annalisa Pina MD\\n-Shannon Ty MD\\n-Addis Vazquez MD\\n-Tara Frederick MD \\n\\n\\n\\nLast visit: \\nDiscussed in length with pt Humira and its side effects and what it can be used for such Psoriatic arthritis patients. Discussed with pt that  there will other medication that will come to help with HS. Discuss that Humira will help with her severe HS as she has failed oral antibiotics, ILK, and topical medications
Detail Level: Simple

## 2022-12-12 NOTE — PROCEDURE: PRESCRIPTION MEDICATION MANAGEMENT
Discontinue Regimen: prednisone 20 mg tablet \\nQuantity: 228.0 Tablet\\nSig: Take 3 pills x5 days, Take 2 pills x5 days, take 1 pill x5 days then, 1/2 tablet x5 days.
Continue Regimen: Vitamin D and calcium supplement.
Plan: hydrocodone 7.5 mg-acetaminophen 325 mg tablet \\nQuantity: 20.0 Tablet  Days Supply: 30\\nSig: Take 1 PO Q8 hours PRN pain.\\n\\nPatient is to have her next Humira injection on 11/25/22. Due to current condition of her HS, she will delay her upcoming injection until re-evaluated after steroid taper. Pt reports worsening despite being on humira
Render In Strict Bullet Format?: No
Initiate Treatment: prednisone 10 mg tablet \\nQuantity: 30.0 Tablet\\nSig: Take 1 PO QD x30 days with a full meal.\\n\\n\\nomeprazole 20 mg capsule,delayed release \\nQuantity: 30.0 Capsule\\nSig: Take 1 PO QAM.
Detail Level: Zone

## 2023-01-12 ENCOUNTER — APPOINTMENT (RX ONLY)
Dept: URBAN - METROPOLITAN AREA CLINIC 114 | Facility: CLINIC | Age: 38
Setting detail: DERMATOLOGY
End: 2023-01-12

## 2023-01-12 DIAGNOSIS — Z79.899 OTHER LONG TERM (CURRENT) DRUG THERAPY: ICD-10-CM

## 2023-01-12 DIAGNOSIS — L73.2 HIDRADENITIS SUPPURATIVA: ICD-10-CM | Status: WORSENING

## 2023-01-12 PROCEDURE — 99214 OFFICE O/P EST MOD 30 MIN: CPT | Mod: 25

## 2023-01-12 PROCEDURE — ? COSENTYX INITIATION

## 2023-01-12 PROCEDURE — ? COUNSELING

## 2023-01-12 PROCEDURE — ? PRESCRIPTION MEDICATION MANAGEMENT

## 2023-01-12 PROCEDURE — ? HIGH RISK MEDICATION MONITORING

## 2023-01-12 PROCEDURE — ? COSENTYX INJECTION

## 2023-01-12 PROCEDURE — ? ADDITIONAL NOTES

## 2023-01-12 PROCEDURE — ? SEPARATE AND IDENTIFIABLE DOCUMENTATION

## 2023-01-12 PROCEDURE — 96372 THER/PROPH/DIAG INJ SC/IM: CPT

## 2023-01-12 ASSESSMENT — LOCATION ZONE DERM
LOCATION ZONE: VULVA
LOCATION ZONE: LEG

## 2023-01-12 ASSESSMENT — LOCATION DETAILED DESCRIPTION DERM
LOCATION DETAILED: MONS PUBIS
LOCATION DETAILED: RIGHT ANTERIOR PROXIMAL THIGH
LOCATION DETAILED: LEFT ANTERIOR PROXIMAL THIGH

## 2023-01-12 ASSESSMENT — LOCATION SIMPLE DESCRIPTION DERM
LOCATION SIMPLE: RIGHT THIGH
LOCATION SIMPLE: GROIN
LOCATION SIMPLE: LEFT THIGH

## 2023-01-12 NOTE — PROCEDURE: COSENTYX INITIATION
Is Phototherapy Contraindicated?: No
Diagnosis (Required): Psoriasis
Detail Level: Zone
Pregnancy And Lactation Warning Text: This medication is Pregnancy Category B and is considered safe during pregnancy. It is unknown if this medication is excreted in breast milk.
Cosentyx Monitoring Guidelines: A yearly test for tuberculosis is required while taking Cosentyx.
Cosentyx Dosing: 300 mg SC week 0, 1, 2, 3, and 4 then every 4 weeks after that

## 2023-01-12 NOTE — PROCEDURE: COSENTYX INJECTION
Expiration Date (Optional): 7/2023
Ndc (150mg Syringe): [address]-49
Was The Medication Purchased By The Clinic?: No
Detail Level: None
Consent: The risks of pain and injection site reactions were reviewed with the patient prior to the injection.
Render If Medication Purchased By Clinic In Visit Note?: Yes
Ndc (150mg Pen): [address]-78
Lot # (Optional): [id number]
Syringe Size Used (Required For Enhanced Ndc): 150mg/ml Prefilled Pen
Administered By (Optional): Trisha MI, CDT
Cosentyx Amount: 300 mg
J-Code:

## 2023-01-12 NOTE — PROCEDURE: ADDITIONAL NOTES
Render Risk Assessment In Note?: no
Additional Notes: 1/12/23\\nPt reports when she does humira injections her HS flares, she has not done it for one month now. We will start her on Cosentyx. She has been on about 2 months of oral steroids, tapering down at 5 mg per day now. In 2 weeks we can reduce to 2.t mg if improved \\n\\n\\n12/12/22\\nPatient reports she was doing much better until she lowered the prednisone to 5 mg per day, she was better controlled at 10 mg per day. She was holding humira and restarting this week. will continue 1 month of steroid pills at 10 mg when she was not flaring, then taper very slowly to 5 and 2.5 mg. Will get pre approval for Cosentyx as she has failed humira, she has severe genitals involvement that is worsening on humira not improving. \\n10mg x1 month, 5mg for 2 weeks then, 2.5mg for 1 week then will discontinue. \\n\\n\\n\\n11/21/22\\nPatient is not showing improvement as anticipated. She will continue the Prednisone amanda prescription given at last visit. Discussed that she is to inject her next Humira shot once she start the last of the Prednisone amanda, 1/2 tablet daily. Discussed in detail she needs to be seen by [facility] in order to find a more effective treatment for her. She was given that following physician name to call and schedule and appointment: \\n\\n-Magaly Kim MD\\n-Annalisa Pina MD\\n-Shannon Ty MD\\n-Addis Vazquez MD\\n-Tara Frederick MD \\n\\n\\n\\nLast visit: \\nDiscussed in length with pt Humira and its side effects and what it can be used for such Psoriatic arthritis patients. Discussed with pt that  there will other medication that will come to help with HS. Discuss that Humira will help with her severe HS as she has failed oral antibiotics, ILK, and topical medications
Detail Level: Simple

## 2023-02-07 ENCOUNTER — APPOINTMENT (RX ONLY)
Dept: URBAN - METROPOLITAN AREA CLINIC 114 | Facility: CLINIC | Age: 38
Setting detail: DERMATOLOGY
End: 2023-02-07

## 2023-02-07 DIAGNOSIS — L73.2 HIDRADENITIS SUPPURATIVA: ICD-10-CM | Status: WORSENING

## 2023-02-07 DIAGNOSIS — Z79.899 OTHER LONG TERM (CURRENT) DRUG THERAPY: ICD-10-CM

## 2023-02-07 PROCEDURE — 99214 OFFICE O/P EST MOD 30 MIN: CPT

## 2023-02-07 PROCEDURE — ? COSENTYX MONITORING

## 2023-02-07 PROCEDURE — ? ADDITIONAL NOTES

## 2023-02-07 PROCEDURE — ? PRESCRIPTION MEDICATION MANAGEMENT

## 2023-02-07 PROCEDURE — ? HIGH RISK MEDICATION MONITORING

## 2023-02-07 PROCEDURE — ? COUNSELING

## 2023-02-07 PROCEDURE — ? SEPARATE AND IDENTIFIABLE DOCUMENTATION

## 2023-02-07 ASSESSMENT — LOCATION DETAILED DESCRIPTION DERM: LOCATION DETAILED: MONS PUBIS

## 2023-02-07 ASSESSMENT — LOCATION SIMPLE DESCRIPTION DERM: LOCATION SIMPLE: GROIN

## 2023-02-07 ASSESSMENT — LOCATION ZONE DERM: LOCATION ZONE: VULVA

## 2023-02-07 NOTE — PROCEDURE: PRESCRIPTION MEDICATION MANAGEMENT
Discontinue Regimen: prednisone 20 mg tablet \\nQuantity: 228.0 Tablet\\nSig: Take 3 pills x5 days, Take 2 pills x5 days, take 1 pill x5 days then, 1/2 tablet x5 days.
Continue Regimen: -Vitamin D and calcium supplement.\\n-omeprazole 20 mg capsule,delayed release \\nQuantity: 30.0 Capsule\\nSig: Take 1 PO QAM.\\n-hydrocodone 7.5 mg-acetaminophen 325 mg tablet \\nQuantity: 20.0 Tablet  Days Supply: 30\\nSig: Take 1 PO Q8 hours PRN pain.
Render In Strict Bullet Format?: No
Detail Level: Zone

## 2023-02-07 NOTE — PROCEDURE: ADDITIONAL NOTES
Render Risk Assessment In Note?: no
Additional Notes: 2/7/23\\nPt has completed 3 shots of  Cosentyx. She states after her last shot on 1/26/23, she experienced fever, headache and body fatigue. \\nDiscussed with patient that the symptoms she experienced were not likely caused by the Cosentyx. Advised her to continue the Cosentyx since it is likely to help her HS. She is f;wrong a little today likely because dose of her prednisone was lowered on the taper to 2.5 recently, I don’t think that Cosentyx is causing a flare it is too early to . Advised her to give Cosentyx a few months before judging if it is working. \\n\\n1/12/23\\nPt reports when she does humira injections her HS flares, she has not done it for one month now. We will start her on Cosentyx. She has been on about 2 months of oral steroids, tapering down at 5 mg per day now. In 2 weeks we can reduce to 2.t mg if improved \\n\\n\\n12/12/22\\nPatient reports she was doing much better until she lowered the prednisone to 5 mg per day, she was better controlled at 10 mg per day. She was holding humira and restarting this week. will continue 1 month of steroid pills at 10 mg when she was not flaring, then taper very slowly to 5 and 2.5 mg. Will get pre approval for Cosentyx as she has failed humira, she has severe genitals involvement that is worsening on humira not improving. \\n10mg x1 month, 5mg for 2 weeks then, 2.5mg for 1 week then will discontinue. \\n\\n\\n\\n11/21/22\\nPatient is not showing improvement as anticipated. She will continue the Prednisone amanda prescription given at last visit. Discussed that she is to inject her next Humira shot once she start the last of the Prednisone amanda, 1/2 tablet daily. Discussed in detail she needs to be seen by [facility] in order to find a more effective treatment for her. She was given that following physician name to call and schedule and appointment: \\n\\n-Magaly Kim MD\\n-Annalisa Pina MD\\n-Shannon Ty MD\\n-Addis Vazquez MD\\n-Tara Frederick MD \\n\\n\\n\\nLast visit: \\nDiscussed in length with pt Humira and its side effects and what it can be used for such Psoriatic arthritis patients. Discussed with pt that  there will other medication that will come to help with HS. Discuss that Humira will help with her severe HS as she has failed oral antibiotics, ILK, and topical medications
Detail Level: Simple

## 2023-02-07 NOTE — PROCEDURE: COSENTYX MONITORING
Add High Risk Medication Management Associated Diagnosis?: No
Patient Reported Weight(Optional But Include Units): 138
Detail Level: Zone
Length Of Therapy: 1 month

## 2023-02-17 ENCOUNTER — RX ONLY (OUTPATIENT)
Age: 38
Setting detail: RX ONLY
End: 2023-02-17

## 2023-02-17 RX ORDER — ADALIMUMAB 80MG/0.8ML
KIT SUBCUTANEOUS
Qty: 1 | Refills: 5 | Status: ERX

## 2023-03-21 ENCOUNTER — APPOINTMENT (RX ONLY)
Dept: URBAN - METROPOLITAN AREA CLINIC 114 | Facility: CLINIC | Age: 38
Setting detail: DERMATOLOGY
End: 2023-03-21

## 2023-03-21 DIAGNOSIS — Z79.899 OTHER LONG TERM (CURRENT) DRUG THERAPY: ICD-10-CM

## 2023-03-21 DIAGNOSIS — L73.2 HIDRADENITIS SUPPURATIVA: ICD-10-CM | Status: WORSENING

## 2023-03-21 PROCEDURE — ? HIGH RISK MEDICATION MONITORING

## 2023-03-21 PROCEDURE — ? SEPARATE AND IDENTIFIABLE DOCUMENTATION

## 2023-03-21 PROCEDURE — 99214 OFFICE O/P EST MOD 30 MIN: CPT

## 2023-03-21 PROCEDURE — ? COUNSELING

## 2023-03-21 PROCEDURE — ? PRESCRIPTION MEDICATION MANAGEMENT

## 2023-03-21 PROCEDURE — ? PRESCRIPTION

## 2023-03-21 PROCEDURE — ? ADDITIONAL NOTES

## 2023-03-21 RX ORDER — PREDNISONE 10 MG/1
TABLET ORAL
Qty: 30 | Refills: 0 | Status: ERX

## 2023-03-21 RX ORDER — SPIRONOLACTONE 50 MG/1
TABLET, FILM COATED ORAL
Qty: 90 | Refills: 1 | Status: ERX | COMMUNITY
Start: 2023-03-21

## 2023-03-21 RX ORDER — CLINDAMYCIN HYDROCHLORIDE 300 MG/1
CAPSULE ORAL
Qty: 60 | Refills: 1 | Status: ERX | COMMUNITY
Start: 2023-03-21

## 2023-03-21 RX ORDER — RIFAMPIN 300 MG/1
CAPSULE ORAL
Qty: 60 | Refills: 1 | Status: ERX | COMMUNITY
Start: 2023-03-21

## 2023-03-21 RX ADMIN — CLINDAMYCIN HYDROCHLORIDE: 300 CAPSULE ORAL at 00:00

## 2023-03-21 RX ADMIN — SPIRONOLACTONE: 50 TABLET, FILM COATED ORAL at 00:00

## 2023-03-21 RX ADMIN — RIFAMPIN: 300 CAPSULE ORAL at 00:00

## 2023-03-21 ASSESSMENT — LOCATION ZONE DERM: LOCATION ZONE: VULVA

## 2023-03-21 ASSESSMENT — LOCATION DETAILED DESCRIPTION DERM: LOCATION DETAILED: MONS PUBIS

## 2023-03-21 ASSESSMENT — LOCATION SIMPLE DESCRIPTION DERM: LOCATION SIMPLE: GROIN

## 2023-03-21 NOTE — PROCEDURE: PRESCRIPTION MEDICATION MANAGEMENT
Discontinue Regimen: Cosentyx
Continue Regimen: -Vitamin D and calcium supplement.\\n-omeprazole 20 mg capsule,delayed release \\nQuantity: 30.0 Capsule\\nSig: Take 1 PO QAM.\\n-hydrocodone 7.5 mg-acetaminophen 325 mg tablet \\nQuantity: 20.0 Tablet  Days Supply: 30\\nSig: Take 1 PO Q8 hours PRN pain.
Render In Strict Bullet Format?: No
Initiate Treatment: clindamycin HCl 300 mg capsule \\nQuantity: 60.0 Capsule\\nSig: Take 1 PO BID with food and water.\\n\\nrifampin 300 mg capsule \\nQuantity: 60.0 Capsule  Days Supply: 30\\nSig: Take 1 PO BID with food and water.\\n\\nspironolactone 50 mg tablet \\nQuantity: 90.0 Tablet  Days Supply: 30\\nSig: Take 3 tablets PO QD.\\n\\nprednisone 10 mg tablet \\nQuantity: 30.0 Tablet  Days Supply: 30\\nSig: Take 1 PO QD.
Detail Level: Zone

## 2023-03-21 NOTE — PROCEDURE: ADDITIONAL NOTES
Render Risk Assessment In Note?: no
Additional Notes: 3/21/23\\n-Pt has failed Humira and now believes the loading dose of Cosentyx has worsened her condition. Steroid pills were the only helpful medication \\n-Will put her on Clindamycin, Rifampicin and Spironolactone, and 10mg of Prednisone a dayShe will start the new prescriptions after Ramadan. Will start antibiotics as once daily since she will be fasting. \\n-She is schedule with the [facility] specialist at [facility] next week. \\n-Will discontinue Cosentyx. \\n-Discussed with pt this may Genital Crohn’s will talk to GI about about genital Crohn?s.\\n-Pt is aware that she will to amanda down before discontinuing the Prednisone. Will follow up in 1 month to discuss. \\n\\n2/7/23\\nPt has completed 3 shots of  Cosentyx. She states after her last shot on 1/26/23, she experienced fever, headache and body fatigue. \\nDiscussed with patient that the symptoms she experienced were not likely caused by the Cosentyx. Advised her to continue the Cosentyx since it is likely to help her HS. She is flaring a little today likely because dose of her prednisone was lowered on the taper to 2.5 recently, I don’t think that Cosentyx is causing a flare it is too early to . Advised her to give Cosentyx a few months before judging if it is working. \\n\\n1/12/23\\nPt reports when she does humira injections her HS flares, she has not done it for one month now. We will start her on Cosentyx. She has been on about 2 months of oral steroids, tapering down at 5 mg per day now. In 2 weeks we can reduce to 2.t mg if improved \\n\\n\\n12/12/22\\nPatient reports she was doing much better until she lowered the prednisone to 5 mg per day, she was better controlled at 10 mg per day. She was holding humira and restarting this week. will continue 1 month of steroid pills at 10 mg when she was not flaring, then taper very slowly to 5 and 2.5 mg. Will get pre approval for Cosentyx as she has failed humira, she has severe genitals involvement that is worsening on humira not improving. \\n10mg x1 month, 5mg for 2 weeks then, 2.5mg for 1 week then will discontinue. \\n\\n\\n\\n11/21/22\\nPatient is not showing improvement as anticipated. She will continue the Prednisone amanda prescription given at last visit. Discussed that she is to inject her next Humira shot once she start the last of the Prednisone amanda, 1/2 tablet daily. Discussed in detail she needs to be seen by [facility] in order to find a more effective treatment for her. She was given that following physician name to call and schedule and appointment: \\n\\n-Magaly Kim MD\\n-Annalisa Pina MD\\n-Shannon Ty MD\\n-Addis Vazquez MD\\n-Tara Frederick MD \\n\\n\\n\\nLast visit: \\nDiscussed in length with pt Humira and its side effects and what it can be used for such Psoriatic arthritis patients. Discussed with pt that  there will other medication that will come to help with HS. Discuss that Humira will help with her severe HS as she has failed oral antibiotics, ILK, and topical medications
Detail Level: Simple

## 2023-09-28 ENCOUNTER — APPOINTMENT (RX ONLY)
Dept: URBAN - METROPOLITAN AREA CLINIC 114 | Facility: CLINIC | Age: 38
Setting detail: DERMATOLOGY
End: 2023-09-28

## 2023-09-28 DIAGNOSIS — L72.8 OTHER FOLLICULAR CYSTS OF THE SKIN AND SUBCUTANEOUS TISSUE: ICD-10-CM

## 2023-09-28 DIAGNOSIS — L73.2 HIDRADENITIS SUPPURATIVA: ICD-10-CM

## 2023-09-28 DIAGNOSIS — L70.0 ACNE VULGARIS: ICD-10-CM

## 2023-09-28 PROCEDURE — ? ADDITIONAL NOTES

## 2023-09-28 PROCEDURE — ? COUNSELING

## 2023-09-28 PROCEDURE — 11900 INJECT SKIN LESIONS </W 7: CPT

## 2023-09-28 PROCEDURE — ? PRESCRIPTION

## 2023-09-28 PROCEDURE — 99214 OFFICE O/P EST MOD 30 MIN: CPT | Mod: 25

## 2023-09-28 PROCEDURE — ? INTRALESIONAL KENALOG

## 2023-09-28 PROCEDURE — ? PRESCRIPTION MEDICATION MANAGEMENT

## 2023-09-28 RX ORDER — METRONIDAZOLE 7.5 MG/G
GEL TOPICAL
Qty: 45 | Refills: 3 | Status: ERX | COMMUNITY
Start: 2023-09-28

## 2023-09-28 RX ORDER — SULFACETAMIDE SODIUM, SULFUR 98; 48 MG/G; MG/G
LIQUID TOPICAL
Qty: 285 | Refills: 5 | Status: ERX | COMMUNITY
Start: 2023-09-28

## 2023-09-28 RX ORDER — AZELAIC ACID 0.15 G/G
GEL TOPICAL
Qty: 50 | Refills: 3 | Status: ERX | COMMUNITY
Start: 2023-09-28

## 2023-09-28 RX ADMIN — SULFACETAMIDE SODIUM, SULFUR: 98; 48 LIQUID TOPICAL at 00:00

## 2023-09-28 RX ADMIN — METRONIDAZOLE: 7.5 GEL TOPICAL at 00:00

## 2023-09-28 RX ADMIN — AZELAIC ACID: 0.15 GEL TOPICAL at 00:00

## 2023-09-28 ASSESSMENT — LOCATION SIMPLE DESCRIPTION DERM
LOCATION SIMPLE: RIGHT CHEEK
LOCATION SIMPLE: GROIN
LOCATION SIMPLE: LEFT CHEEK

## 2023-09-28 ASSESSMENT — LOCATION DETAILED DESCRIPTION DERM
LOCATION DETAILED: RIGHT INFERIOR LATERAL MALAR CHEEK
LOCATION DETAILED: MONS PUBIS
LOCATION DETAILED: RIGHT INFERIOR CENTRAL MALAR CHEEK
LOCATION DETAILED: LEFT CENTRAL MALAR CHEEK

## 2023-09-28 ASSESSMENT — LOCATION ZONE DERM
LOCATION ZONE: FACE
LOCATION ZONE: VULVA

## 2023-09-28 NOTE — PROCEDURE: ADDITIONAL NOTES
Render Risk Assessment In Note?: no
Additional Notes: 09/28/23\\nPatient has started Remicade and has seen a specialist at [facility]. \\n\\n\\n3/21/23\\n-Pt has failed Humira and now believes the loading dose of Cosentyx has worsened her condition. Steroid pills were the only helpful medication \\n-Will put her on Clindamycin, Rifampicin and Spironolactone, and 10mg of Prednisone a dayShe will start the new prescriptions after Ramadan. Will start antibiotics as once daily since she will be fasting. \\n-She is schedule with the [facility] specialist at [facility] next week. \\n-Will discontinue Cosentyx. \\n-Discussed with pt this may Genital Crohn’s will talk to GI about about genital Crohn?s.\\n-Pt is aware that she will to amanda down before discontinuing the Prednisone. Will follow up in 1 month to discuss. \\n\\n2/7/23\\nPt has completed 3 shots of  Cosentyx. She states after her last shot on 1/26/23, she experienced fever, headache and body fatigue. \\nDiscussed with patient that the symptoms she experienced were not likely caused by the Cosentyx. Advised her to continue the Cosentyx since it is likely to help her HS. She is flaring a little today likely because dose of her prednisone was lowered on the taper to 2.5 recently, I don’t think that Cosentyx is causing a flare it is too early to . Advised her to give Cosentyx a few months before judging if it is working. \\n\\n1/12/23\\nPt reports when she does humira injections her HS flares, she has not done it for one month now. We will start her on Cosentyx. She has been on about 2 months of oral steroids, tapering down at 5 mg per day now. In 2 weeks we can reduce to 2.t mg if improved \\n\\n\\n12/12/22\\nPatient reports she was doing much better until she lowered the prednisone to 5 mg per day, she was better controlled at 10 mg per day. She was holding humira and restarting this week. will continue 1 month of steroid pills at 10 mg when she was not flaring, then taper very slowly to 5 and 2.5 mg. Will get pre approval for Cosentyx as she has failed humira, she has severe genitals involvement that is worsening on humira not improving. \\n10mg x1 month, 5mg for 2 weeks then, 2.5mg for 1 week then will discontinue. \\n\\n\\n\\n11/21/22\\nPatient is not showing improvement as anticipated. She will continue the Prednisone amanda prescription given at last visit. Discussed that she is to inject her next Humira shot once she start the last of the Prednisone amanda, 1/2 tablet daily. Discussed in detail she needs to be seen by [facility] in order to find a more effective treatment for her. She was given that following physician name to call and schedule and appointment: \\n\\n-aMgaly Kim MD\\n-Annalisa Pina MD\\n-Shannon Ty MD\\n-Addis Vazquez MD\\n-Tara Frederick MD \\n\\n\\n\\nLast visit: \\nDiscussed in length with pt Humira and its side effects and what it can be used for such Psoriatic arthritis patients. Discussed with pt that  there will other medication that will come to help with HS. Discuss that Humira will help with her severe HS as she has failed oral antibiotics, ILK, and topical medications
Detail Level: Simple
Additional Notes: Patient has history of HS, advised that having this condition can cause acne as well.

## 2023-09-28 NOTE — PROCEDURE: COUNSELING

## 2023-09-28 NOTE — PROCEDURE: INTRALESIONAL KENALOG
How Many Mls Were Removed From The 40 Mg/Ml (5ml) Vial When Preparing The Injectable Solution?: 0
Treatment Number (Optional): 1
Size Of Lesion (Optional): 1.5
Administered By (Optional): dr Bey
Kenalog Preparation: Kenalog
Which Kenalog Vial Was Used?: Kenalog 10 mg/ml (5 ml vial)
Lot # (Optional): [id number]
Kenalog Type Of Vial: Multiple Dose
Medical Necessity Clause: This procedure was medically necessary because the lesions that were treated were:
Validate Note Data When Using Inventory: Yes
How Many Mls Were Removed From The 10 Mg/Ml (5ml) Vial When Preparing The Injectable Solution?: 0.1
Bill For Wasted Drug?: no
Concentration Of Solution Injected (Mg/Ml): 5.0
Detail Level: Detailed
Consent: The risks of atrophy were reviewed with the patient.
Expiration Date (Optional): 09/25

## 2023-10-12 ENCOUNTER — APPOINTMENT (RX ONLY)
Dept: URBAN - METROPOLITAN AREA CLINIC 114 | Facility: CLINIC | Age: 38
Setting detail: DERMATOLOGY
End: 2023-10-12

## 2023-10-12 DIAGNOSIS — L73.2 HIDRADENITIS SUPPURATIVA: ICD-10-CM

## 2023-10-12 DIAGNOSIS — L72.8 OTHER FOLLICULAR CYSTS OF THE SKIN AND SUBCUTANEOUS TISSUE: ICD-10-CM

## 2023-10-12 DIAGNOSIS — L70.0 ACNE VULGARIS: ICD-10-CM

## 2023-10-12 PROCEDURE — ? PRESCRIPTION MEDICATION MANAGEMENT

## 2023-10-12 PROCEDURE — 99213 OFFICE O/P EST LOW 20 MIN: CPT | Mod: 25

## 2023-10-12 PROCEDURE — ? INTRALESIONAL KENALOG

## 2023-10-12 PROCEDURE — ? COUNSELING

## 2023-10-12 PROCEDURE — 11900 INJECT SKIN LESIONS </W 7: CPT

## 2023-10-12 PROCEDURE — ? ADDITIONAL NOTES

## 2023-10-12 ASSESSMENT — LOCATION ZONE DERM
LOCATION ZONE: VULVA
LOCATION ZONE: FACE

## 2023-10-12 ASSESSMENT — LOCATION DETAILED DESCRIPTION DERM
LOCATION DETAILED: MONS PUBIS
LOCATION DETAILED: RIGHT INFERIOR LATERAL MALAR CHEEK
LOCATION DETAILED: LEFT CENTRAL MALAR CHEEK
LOCATION DETAILED: RIGHT INFERIOR CENTRAL MALAR CHEEK

## 2023-10-12 ASSESSMENT — LOCATION SIMPLE DESCRIPTION DERM
LOCATION SIMPLE: LEFT CHEEK
LOCATION SIMPLE: RIGHT CHEEK
LOCATION SIMPLE: GROIN

## 2023-10-12 NOTE — PROCEDURE: INTRALESIONAL KENALOG
How Many Mls Were Removed From The 10 Mg/Ml (5ml) Vial When Preparing The Injectable Solution?: 0
Consent: The risks of atrophy were reviewed with the patient.
Bill For Wasted Drug?: no
Concentration Of Solution Injected (Mg/Ml): 5.0
Expiration Date (Optional): 9/2025
Total Volume Injected (Ccs- Only Use Numbers And Decimals): 0.1
Detail Level: Detailed
Medical Necessity Clause: This procedure was medically necessary because the lesions that were treated were:
Administered By (Optional): dr Bey
Lot # (Optional): [id number]
Kenalog Preparation: Kenalog
Validate Note Data When Using Inventory: Yes
Which Kenalog Vial Was Used?: Kenalog 10 mg/ml (5 ml vial)
Kenalog Type Of Vial: Multiple Dose

## 2023-10-12 NOTE — PROCEDURE: ADDITIONAL NOTES
Render Risk Assessment In Note?: no
Detail Level: Simple
Additional Notes: Patient has history of HS, advised that having this condition can cause acne as well.
Additional Notes: 09/28/23\\nPatient has started Remicade and has seen a specialist at [facility]. \\n\\n\\n3/21/23\\n-Pt has failed Humira and now believes the loading dose of Cosentyx has worsened her condition. Steroid pills were the only helpful medication \\n-Will put her on Clindamycin, Rifampicin and Spironolactone, and 10mg of Prednisone a dayShe will start the new prescriptions after Ramadan. Will start antibiotics as once daily since she will be fasting. \\n-She is schedule with the Vulva specialist at [facility] next week. \\n-Will discontinue Cosentyx. \\n-Discussed with pt this may Genital Crohn’s will talk to GI about about genital Crohn?s.\\n-Pt is aware that she will to amanda down before discontinuing the Prednisone. Will follow up in 1 month to discuss. \\n\\n2/7/23\\nPt has completed 3 shots of  Cosentyx. She states after her last shot on 1/26/23, she experienced fever, headache and body fatigue. \\nDiscussed with patient that the symptoms she experienced were not likely caused by the Cosentyx. Advised her to continue the Cosentyx since it is likely to help her HS. She is flaring a little today likely because dose of her prednisone was lowered on the taper to 2.5 recently, I don’t think that Cosentyx is causing a flare it is too early to . Advised her to give Cosentyx a few months before judging if it is working. \\n\\n1/12/23\\nPt reports when she does humira injections her HS flares, she has not done it for one month now. We will start her on Cosentyx. She has been on about 2 months of oral steroids, tapering down at 5 mg per day now. In 2 weeks we can reduce to 2.t mg if improved \\n\\n\\n12/12/22\\nPatient reports she was doing much better until she lowered the prednisone to 5 mg per day, she was better controlled at 10 mg per day. She was holding humira and restarting this week. will continue 1 month of steroid pills at 10 mg when she was not flaring, then taper very slowly to 5 and 2.5 mg. Will get pre approval for Cosentyx as she has failed humira, she has severe genitals involvement that is worsening on humira not improving. \\n10mg x1 month, 5mg for 2 weeks then, 2.5mg for 1 week then will discontinue. \\n\\n\\n\\n11/21/22\\nPatient is not showing improvement as anticipated. She will continue the Prednisone amanda prescription given at last visit. Discussed that she is to inject her next Humira shot once she start the last of the Prednisone amanda, 1/2 tablet daily. Discussed in detail she needs to be seen by [facility] in order to find a more effective treatment for her. She was given that following physician name to call and schedule and appointment: \\n\\n-Magaly Kim MD\\n-Annalisa Pina MD\\n-Shannon Ty MD\\n-Addis Vazquez MD\\n-Tara Frederick MD \\n\\n\\n\\nLast visit: \\nDiscussed in length with pt Humira and its side effects and what it can be used for such Psoriatic arthritis patients. Discussed with pt that  there will other medication that will come to help with HS. Discuss that Humira will help with her severe HS as she has failed oral antibiotics, ILK, and topical medications

## 2023-10-12 NOTE — PROCEDURE: PRESCRIPTION MEDICATION MANAGEMENT
Render In Strict Bullet Format?: No
Detail Level: Zone
Continue Regimen: Plexion 9.8 %-4.8 % topical cleanser \\nQuantity: 285.0 g  Days Supply: 30\\nSig: Wash face bid\\n\\nFinacea 15 % topical gel \\nQuantity: 50.0 g  Days Supply: 30\\nSig: Apply pea sized to face at night, 2-3 x week, increase as tolerated\\n\\nmetronidazole 0.75 % topical gel \\nQuantity: 45.0 g  Days Supply: 30\\nSig: Apply pea sized amount to face QAM
Initiate Treatment: DeLaCriuse Sulphur Ointment

## 2023-12-08 ENCOUNTER — APPOINTMENT (RX ONLY)
Dept: URBAN - METROPOLITAN AREA CLINIC 114 | Facility: CLINIC | Age: 38
Setting detail: DERMATOLOGY
End: 2023-12-08

## 2023-12-08 DIAGNOSIS — L73.2 HIDRADENITIS SUPPURATIVA: ICD-10-CM

## 2023-12-08 DIAGNOSIS — T49.0X5 ADVERSE EFFECT OF LOCAL ANTIFUNGAL, ANTI-INFECTIVE AND ANTI-INFLAMMATORY DRUGS: ICD-10-CM

## 2023-12-08 PROBLEM — T49.0X5A ADVERSE EFFECT OF LOCAL ANTIFUNGAL, ANTI-INFECTIVE AND ANTI-INFLAMMATORY DRUGS, INITIAL ENCOUNTER: Status: ACTIVE | Noted: 2023-12-08

## 2023-12-08 PROCEDURE — 99213 OFFICE O/P EST LOW 20 MIN: CPT | Mod: 25

## 2023-12-08 PROCEDURE — ? COUNSELING

## 2023-12-08 PROCEDURE — ? ADDITIONAL NOTES

## 2023-12-08 PROCEDURE — ? INJECTION

## 2023-12-08 PROCEDURE — 11900 INJECT SKIN LESIONS </W 7: CPT

## 2023-12-08 ASSESSMENT — LOCATION DETAILED DESCRIPTION DERM
LOCATION DETAILED: RIGHT CENTRAL MALAR CHEEK
LOCATION DETAILED: MONS PUBIS

## 2023-12-08 ASSESSMENT — LOCATION SIMPLE DESCRIPTION DERM
LOCATION SIMPLE: RIGHT CHEEK
LOCATION SIMPLE: GROIN

## 2023-12-08 ASSESSMENT — LOCATION ZONE DERM
LOCATION ZONE: VULVA
LOCATION ZONE: FACE

## 2023-12-08 NOTE — PROCEDURE: COUNSELING
Patient Specific Counseling (Will Not Stick From Patient To Patient): 12/8/23\\nDiscussed different treatment options including filler, price given\\nBut given her autoimmune disease I recommend just saline injections a few times to improve indentation naturally
Detail Level: Detailed

## 2023-12-08 NOTE — PROCEDURE: ADDITIONAL NOTES
Render Risk Assessment In Note?: no
Additional Notes: 09/28/23\\nPatient has started Remicade and has seen a specialist at [facility]. \\n\\n\\n3/21/23\\n-Pt has failed Humira and now believes the loading dose of Cosentyx has worsened her condition. Steroid pills were the only helpful medication \\n-Will put her on Clindamycin, Rifampicin and Spironolactone, and 10mg of Prednisone a dayShe will start the new prescriptions after Ramadan. Will start antibiotics as once daily since she will be fasting. \\n-She is schedule with the [facility] specialist at [facility] next week. \\n-Will discontinue Cosentyx. \\n-Discussed with pt this may Genital Crohn’s will talk to GI about about genital Crohn?s.\\n-Pt is aware that she will to amanda down before discontinuing the Prednisone. Will follow up in 1 month to discuss. \\n\\n2/7/23\\nPt has completed 3 shots of  Cosentyx. She states after her last shot on 1/26/23, she experienced fever, headache and body fatigue. \\nDiscussed with patient that the symptoms she experienced were not likely caused by the Cosentyx. Advised her to continue the Cosentyx since it is likely to help her HS. She is flaring a little today likely because dose of her prednisone was lowered on the taper to 2.5 recently, I don’t think that Cosentyx is causing a flare it is too early to . Advised her to give Cosentyx a few months before judging if it is working. \\n\\n1/12/23\\nPt reports when she does humira injections her HS flares, she has not done it for one month now. We will start her on Cosentyx. She has been on about 2 months of oral steroids, tapering down at 5 mg per day now. In 2 weeks we can reduce to 2.t mg if improved \\n\\n\\n12/12/22\\nPatient reports she was doing much better until she lowered the prednisone to 5 mg per day, she was better controlled at 10 mg per day. She was holding humira and restarting this week. will continue 1 month of steroid pills at 10 mg when she was not flaring, then taper very slowly to 5 and 2.5 mg. Will get pre approval for Cosentyx as she has failed humira, she has severe genitals involvement that is worsening on humira not improving. \\n10mg x1 month, 5mg for 2 weeks then, 2.5mg for 1 week then will discontinue. \\n\\n\\n\\n11/21/22\\nPatient is not showing improvement as anticipated. She will continue the Prednisone amanda prescription given at last visit. Discussed that she is to inject her next Humira shot once she start the last of the Prednisone amanda, 1/2 tablet daily. Discussed in detail she needs to be seen by [facility] in order to find a more effective treatment for her. She was given that following physician name to call and schedule and appointment: \\n\\n-Magaly Kim MD\\n-Annalisa Pina MD\\n-Shannon Ty MD\\n-Addis Vazquez MD\\n-Tara Frederick MD \\n\\n\\n\\nLast visit: \\nDiscussed in length with pt Humira and its side effects and what it can be used for such Psoriatic arthritis patients. Discussed with pt that  there will other medication that will come to help with HS. Discuss that Humira will help with her severe HS as she has failed oral antibiotics, ILK, and topical medications
Detail Level: Simple

## 2024-01-10 ENCOUNTER — APPOINTMENT (RX ONLY)
Dept: URBAN - METROPOLITAN AREA CLINIC 114 | Facility: CLINIC | Age: 39
Setting detail: DERMATOLOGY
End: 2024-01-10

## 2024-01-10 DIAGNOSIS — L73.2 HIDRADENITIS SUPPURATIVA: ICD-10-CM | Status: IMPROVED

## 2024-01-10 DIAGNOSIS — T49.0X5 ADVERSE EFFECT OF LOCAL ANTIFUNGAL, ANTI-INFECTIVE AND ANTI-INFLAMMATORY DRUGS: ICD-10-CM

## 2024-01-10 PROBLEM — T49.0X5A ADVERSE EFFECT OF LOCAL ANTIFUNGAL, ANTI-INFECTIVE AND ANTI-INFLAMMATORY DRUGS, INITIAL ENCOUNTER: Status: ACTIVE | Noted: 2024-01-10

## 2024-01-10 PROCEDURE — ? INJECTION

## 2024-01-10 PROCEDURE — 99213 OFFICE O/P EST LOW 20 MIN: CPT | Mod: 25

## 2024-01-10 PROCEDURE — ? ADDITIONAL NOTES

## 2024-01-10 PROCEDURE — ? COUNSELING

## 2024-01-10 PROCEDURE — 11900 INJECT SKIN LESIONS </W 7: CPT

## 2024-01-10 ASSESSMENT — LOCATION DETAILED DESCRIPTION DERM
LOCATION DETAILED: RIGHT CENTRAL MALAR CHEEK
LOCATION DETAILED: MONS PUBIS

## 2024-01-10 ASSESSMENT — LOCATION ZONE DERM
LOCATION ZONE: VULVA
LOCATION ZONE: FACE

## 2024-01-10 ASSESSMENT — LOCATION SIMPLE DESCRIPTION DERM
LOCATION SIMPLE: RIGHT CHEEK
LOCATION SIMPLE: GROIN

## 2024-01-10 NOTE — PROCEDURE: ADDITIONAL NOTES
Render Risk Assessment In Note?: no
Additional Notes: 09/28/23\\nPatient has started Remicade and has seen a specialist at [facility]. \\n\\n\\n3/21/23\\n-Pt has failed Humira and now believes the loading dose of Cosentyx has worsened her condition. Steroid pills were the only helpful medication \\n-Will put her on Clindamycin, Rifampicin and Spironolactone, and 10mg of Prednisone a dayShe will start the new prescriptions after Ramadan. Will start antibiotics as once daily since she will be fasting. \\n-She is schedule with the Vulva specialist at [facility] next week. \\n-Will discontinue Cosentyx. \\n-Discussed with pt this may Genital Crohn’s will talk to GI about about genital Crohn?s.\\n-Pt is aware that she will to amanda down before discontinuing the Prednisone. Will follow up in 1 month to discuss. \\n\\n2/7/23\\nPt has completed 3 shots of  Cosentyx. She states after her last shot on 1/26/23, she experienced fever, headache and body fatigue. \\nDiscussed with patient that the symptoms she experienced were not likely caused by the Cosentyx. Advised her to continue the Cosentyx since it is likely to help her HS. She is flaring a little today likely because dose of her prednisone was lowered on the taper to 2.5 recently, I don’t think that Cosentyx is causing a flare it is too early to . Advised her to give Cosentyx a few months before judging if it is working. \\n\\n1/12/23\\nPt reports when she does humira injections her HS flares, she has not done it for one month now. We will start her on Cosentyx. She has been on about 2 months of oral steroids, tapering down at 5 mg per day now. In 2 weeks we can reduce to 2.t mg if improved \\n\\n\\n12/12/22\\nPatient reports she was doing much better until she lowered the prednisone to 5 mg per day, she was better controlled at 10 mg per day. She was holding humira and restarting this week. will continue 1 month of steroid pills at 10 mg when she was not flaring, then taper very slowly to 5 and 2.5 mg. Will get pre approval for Cosentyx as she has failed humira, she has severe genitals involvement that is worsening on humira not improving. \\n10mg x1 month, 5mg for 2 weeks then, 2.5mg for 1 week then will discontinue. \\n\\n\\n\\n11/21/22\\nPatient is not showing improvement as anticipated. She will continue the Prednisone amanda prescription given at last visit. Discussed that she is to inject her next Humira shot once she start the last of the Prednisone amanda, 1/2 tablet daily. Discussed in detail she needs to be seen by [facility] in order to find a more effective treatment for her. She was given that following physician name to call and schedule and appointment: \\n\\n-Magaly Kim MD\\n-Annalisa Pina MD\\n-Shannon Ty MD\\n-Addis Vazquez MD\\n-Tara Frederick MD \\n\\n\\n\\nLast visit: \\nDiscussed in length with pt Humira and its side effects and what it can be used for such Psoriatic arthritis patients. Discussed with pt that  there will other medication that will come to help with HS. Discuss that Humira will help with her severe HS as she has failed oral antibiotics, ILK, and topical medications
Detail Level: Simple

## 2024-01-10 NOTE — PROCEDURE: INJECTION
Bill J-Code: yes
Route: IL
Type Of Vial Used?: Multi-Dose
Detail Level: None
Dose Administered (Numbers Only - Mg, G, Mcg, Units, Cc): 0
Bill For Wasted Drug?: no
Expiration Date (Optional): 2/10/24
Post-Care Instructions: I reviewed with the patient in detail post-care instructions. Patient understands to keep the injection sites clean and call the clinic if there is any redness, swelling or pain.
Total Volume Injected In Cc (Will Not Affected Billing): 3
Treatment Number: 1
Consent: The risks of the medication were reviewed with the patient.
Administered By (Optional): Dr. Bey
Lot # (Optional): [id number]
Procedure Information: Please note that the numeric value listed in the Medication (1) and associated J-code units and Medication (2) and associated J-code units variables are j-code amounts and do not represent either the concentration or the total amount of the medications injected.  I strongly recommend selecting no to the Render J-code information in note question. This will allow your note to be more clear. If you are billing j-codes with your injection codes you need to document the total amount of the medication injected. This amount should match the j-code units. For example, if you are injecting Triamcinolone 40mg as an intramuscular injection you would select 40 for the dose field.. This would allow you to document  with 4 units (40mg = 10mg x 4). The total volume is not used to calculate j-codes only the amount of the medication administered.
Medication (1) And Associated J-Code Units: Saline

## 2024-12-28 NOTE — PROCEDURE: PRESCRIPTION MEDICATION MANAGEMENT
Male
Plan: Patient is to have her next Humira injection on 11/25/22. Due to current condition of her HS, she will delay her upcoming injection until re-evaluated after steroid taper. Pt reports worsening despite being on humira
Render In Strict Bullet Format?: No
Initiate Treatment: hydrocodone 7.5 mg-acetaminophen 325 mg tablet \\nQuantity: 20.0 Tablet  Days Supply: 30\\nSig: Take 1 PO Q8 hours PRN pain.\\n\\nprednisone 20 mg tablet \\nQuantity: 228.0 Tablet\\nSig: Take 3 pills x5 days, Take 2 pills x5 days, take 1 pill x5 days then, 1/2 tablet x5 days.
Detail Level: Zone

## 2024-12-31 ENCOUNTER — APPOINTMENT (OUTPATIENT)
Dept: URBAN - METROPOLITAN AREA CLINIC 114 | Facility: CLINIC | Age: 39
Setting detail: DERMATOLOGY
End: 2024-12-31

## 2024-12-31 DIAGNOSIS — B07.8 OTHER VIRAL WARTS: ICD-10-CM | Status: WORSENING

## 2024-12-31 PROCEDURE — ? LIQUID NITROGEN

## 2024-12-31 PROCEDURE — 99213 OFFICE O/P EST LOW 20 MIN: CPT | Mod: 25

## 2024-12-31 PROCEDURE — ? COUNSELING

## 2024-12-31 PROCEDURE — 17110 DESTRUCTION B9 LES UP TO 14: CPT

## 2024-12-31 ASSESSMENT — LOCATION SIMPLE DESCRIPTION DERM
LOCATION SIMPLE: LEFT LIP
LOCATION SIMPLE: LEFT NOSE
LOCATION SIMPLE: LEFT CHEEK

## 2024-12-31 ASSESSMENT — LOCATION ZONE DERM
LOCATION ZONE: NOSE
LOCATION ZONE: FACE
LOCATION ZONE: LIP

## 2024-12-31 ASSESSMENT — TOTAL NUMBER OF VERRUCA VULGARIS: # OF LESIONS?: 1

## 2024-12-31 ASSESSMENT — LOCATION DETAILED DESCRIPTION DERM
LOCATION DETAILED: LEFT INFERIOR MEDIAL MALAR CHEEK
LOCATION DETAILED: LEFT NASOLABIAL FOLD
LOCATION DETAILED: LEFT NASAL ALAR GROOVE

## 2024-12-31 NOTE — PROCEDURE: LIQUID NITROGEN
Medical Necessity Information: It is in your best interest to select a reason for this procedure from the list below. All of these items fulfill various CMS LCD requirements except the new and changing color options.
Spray Paint Technique: No
Medical Necessity Clause: This procedure was medically necessary because the lesions that were treated were:
Detail Level: Detailed
Show Spray Paint Technique Variable?: Yes
Duration Of Freeze Thaw-Cycle (Seconds): 3
Post-Care Instructions: I reviewed with the patient in detail post-care instructions. Patient is to wear sunprotection, and avoid picking at any of the treated lesions. Pt may apply Vaseline to crusted or scabbing areas.
Spray Paint Text: The liquid nitrogen was applied to the skin utilizing a spray paint frosting technique.
Consent: The patient's consent was obtained including but not limited to risks of crusting, scabbing, blistering, scarring, darker or lighter pigmentary change, recurrence, incomplete removal and infection.
Number Of Freeze-Thaw Cycles: 1 freeze-thaw cycle

## 2025-06-11 NOTE — PROCEDURE: COUNSELING
Marshall Buckner MD can you please advise on E-advice message   Patient complaining of yeast infection symptoms   Requesting medication    Detail Level: Zone